# Patient Record
Sex: FEMALE | Race: WHITE | NOT HISPANIC OR LATINO | Employment: UNEMPLOYED | URBAN - METROPOLITAN AREA
[De-identification: names, ages, dates, MRNs, and addresses within clinical notes are randomized per-mention and may not be internally consistent; named-entity substitution may affect disease eponyms.]

---

## 2018-07-26 ENCOUNTER — APPOINTMENT (OUTPATIENT)
Dept: OBGYN | Facility: MEDICAL CENTER | Age: 28
End: 2018-07-26

## 2018-08-31 ENCOUNTER — GYNECOLOGY VISIT (OUTPATIENT)
Dept: OBGYN | Facility: MEDICAL CENTER | Age: 28
End: 2018-08-31
Payer: COMMERCIAL

## 2018-08-31 VITALS
HEIGHT: 65 IN | BODY MASS INDEX: 33.99 KG/M2 | SYSTOLIC BLOOD PRESSURE: 140 MMHG | DIASTOLIC BLOOD PRESSURE: 100 MMHG | WEIGHT: 204 LBS

## 2018-08-31 DIAGNOSIS — O99.891 PAIN IN SYMPHYSIS PUBIS DURING PREGNANCY: ICD-10-CM

## 2018-08-31 DIAGNOSIS — M79.18 PAIN IN SYMPHYSIS PUBIS DURING PREGNANCY: ICD-10-CM

## 2018-08-31 DIAGNOSIS — N93.8 DUB (DYSFUNCTIONAL UTERINE BLEEDING): ICD-10-CM

## 2018-08-31 DIAGNOSIS — Z3A.18 18 WEEKS GESTATION OF PREGNANCY: ICD-10-CM

## 2018-08-31 LAB — IN CLINIC OB SCAN: NORMAL

## 2018-08-31 PROCEDURE — 76815 OB US LIMITED FETUS(S): CPT | Performed by: OBSTETRICS & GYNECOLOGY

## 2018-08-31 PROCEDURE — 99203 OFFICE O/P NEW LOW 30 MIN: CPT | Mod: 25 | Performed by: OBSTETRICS & GYNECOLOGY

## 2018-08-31 PROCEDURE — 76830 TRANSVAGINAL US NON-OB: CPT | Performed by: OBSTETRICS & GYNECOLOGY

## 2018-08-31 NOTE — PROGRESS NOTES
"CC: Confirmation of Pregnancy    HPI: Pt is a 27 yo  lmp 18 (sure lmp, menses regular) who presents for evaluation of amenorrhea.  She has had no bleeding since her last menses.  A urine pregnancy test was positive.  She denies vaginal spotting or pain.  She denies notes nausea and vomiting. Complains of severe pain with ambulation and walking up stairs.     ROS: negative for dizziness, SOB, chest pain, palpitations, dysuria, vaginal discharge.    Blood pressure 140/100, height 1.651 m (5' 5\"), weight 92.5 kg (204 lb), last menstrual period 2018, not currently breastfeeding.    GENERAL: Alert, in no apparent distress  PSYCHIATRIC: Appropriate affect, intact insight and judgement.  ABDOMEN: Soft, nontender, nondistended.  Fundus 2 cm below umbillicus. No hepatosplenomegaly.   No rebound or guarding.  No inguinal lymphadenopathy.  BACK: No CVA tenderness  EXTREMITIES: No edema, no calf tenderness.    GENITOURINARY:  Normal external genitalia, no lesions.  Normal urethral meatus, no masses or tenderness.  Normal bladder without fullness or masses.  Vagina well estrogenized, no vaginal discharge or lesions.  Cervix normal length, nontender.  Uterus 18 weeks, NT.  Adnexa nontender, no masses.  Normal anus and perineum.      Limited Abdominal US - performed and interpreted by me    Single gestational sac.  Placenta anterior.    Martinez IUP with + fetal cardiac activity noted.    BPD = 4.09 cm, AC = 11.89 cm, FL = 2.54 cm, C/W 17+5/7 wks.    LES by US 2/3/19.    Ovaries not visualized and cervix grossly normal. Cervical length = 4.25 cm      ASSESSMENT/PLAN:  1. DUB visit - Martinez IUP at 17+5/7 wks.  Prenatal Vitamins.  F/U for NOB appointment next available.  2.  Symphysis pubis pain - PT referral placed.   3. Hx of GDM previous pregnancy    "

## 2018-09-13 ENCOUNTER — HOSPITAL ENCOUNTER (OUTPATIENT)
Facility: MEDICAL CENTER | Age: 28
End: 2018-09-13
Attending: OBSTETRICS & GYNECOLOGY
Payer: COMMERCIAL

## 2018-09-13 ENCOUNTER — HOSPITAL ENCOUNTER (OUTPATIENT)
Dept: LAB | Facility: MEDICAL CENTER | Age: 28
End: 2018-09-13
Attending: OBSTETRICS & GYNECOLOGY
Payer: COMMERCIAL

## 2018-09-13 ENCOUNTER — ROUTINE PRENATAL (OUTPATIENT)
Dept: OBGYN | Facility: MEDICAL CENTER | Age: 28
End: 2018-09-13
Payer: COMMERCIAL

## 2018-09-13 VITALS — WEIGHT: 207 LBS | BODY MASS INDEX: 34.45 KG/M2 | SYSTOLIC BLOOD PRESSURE: 119 MMHG | DIASTOLIC BLOOD PRESSURE: 84 MMHG

## 2018-09-13 DIAGNOSIS — O09.292 HISTORY OF GESTATIONAL DIABETES IN PRIOR PREGNANCY, CURRENTLY PREGNANT IN SECOND TRIMESTER: ICD-10-CM

## 2018-09-13 DIAGNOSIS — Z3A.19 19 WEEKS GESTATION OF PREGNANCY: ICD-10-CM

## 2018-09-13 DIAGNOSIS — Z86.32 HISTORY OF GESTATIONAL DIABETES IN PRIOR PREGNANCY, CURRENTLY PREGNANT IN SECOND TRIMESTER: ICD-10-CM

## 2018-09-13 LAB
ABO GROUP BLD: NORMAL
BACTERIA #/AREA URNS HPF: ABNORMAL /HPF
BLD GP AB SCN SERPL QL: NORMAL
CAOX CRY #/AREA URNS HPF: ABNORMAL /HPF
EPI CELLS #/AREA URNS HPF: ABNORMAL /HPF
RH BLD: NORMAL
WBC #/AREA URNS HPF: ABNORMAL /HPF

## 2018-09-13 PROCEDURE — 86850 RBC ANTIBODY SCREEN: CPT

## 2018-09-13 PROCEDURE — 87491 CHLMYD TRACH DNA AMP PROBE: CPT

## 2018-09-13 PROCEDURE — 87389 HIV-1 AG W/HIV-1&-2 AB AG IA: CPT

## 2018-09-13 PROCEDURE — 87340 HEPATITIS B SURFACE AG IA: CPT

## 2018-09-13 PROCEDURE — 86762 RUBELLA ANTIBODY: CPT

## 2018-09-13 PROCEDURE — 85025 COMPLETE CBC W/AUTO DIFF WBC: CPT

## 2018-09-13 PROCEDURE — 86901 BLOOD TYPING SEROLOGIC RH(D): CPT

## 2018-09-13 PROCEDURE — 86900 BLOOD TYPING SEROLOGIC ABO: CPT

## 2018-09-13 PROCEDURE — 87591 N.GONORRHOEAE DNA AMP PROB: CPT

## 2018-09-13 PROCEDURE — 86780 TREPONEMA PALLIDUM: CPT

## 2018-09-13 PROCEDURE — 59401 PR NEW OB VISIT: CPT | Performed by: OBSTETRICS & GYNECOLOGY

## 2018-09-13 PROCEDURE — 82950 GLUCOSE TEST: CPT

## 2018-09-13 PROCEDURE — 81001 URINALYSIS AUTO W/SCOPE: CPT

## 2018-09-13 PROCEDURE — 36415 COLL VENOUS BLD VENIPUNCTURE: CPT

## 2018-09-13 NOTE — PROGRESS NOTES
NOB.Pt denies bleeding and leaking. PT states that she is having lower abdomen pain when walking, sitting and while moving her legs.

## 2018-09-13 NOTE — PROGRESS NOTES
Cc: New OB visit    HPI:  The patient is a 28 y.o.  19w4d based upon LMP  Patient's last menstrual period was 2018.    Has been having some pain at symphysis, no VB  Worse with movement    She presents for her new obstetric visit.    She reports fetal movement, denies vaginal bleeding, denies leakage of fluid, denies contractions.     She denies nausea/vomiting, headaches, or urinary symptoms.      OB History    Para Term  AB Living   3 1 1     1   SAB TAB Ectopic Molar Multiple Live Births             1      # Outcome Date GA Lbr Alfred/2nd Weight Sex Delivery Anes PTL Lv   3 Current            2 Term 17 40w0d  3.969 kg (8 lb 12 oz) M Vag-Spont  N YUSUF      Complications: Pre-eclampsia   1                  History reviewed. No pertinent past medical history.  History reviewed. No pertinent surgical history.  Social History     Social History   • Marital status:      Spouse name: N/A   • Number of children: N/A   • Years of education: N/A     Occupational History   • Not on file.     Social History Main Topics   • Smoking status: Never Smoker   • Smokeless tobacco: Not on file   • Alcohol use No   • Drug use: No   • Sexual activity: Yes     Partners: Male     Other Topics Concern   • Not on file     Social History Narrative   • No narrative on file     Family History   Problem Relation Age of Onset   • Genetic Maternal Grandmother      Allergies:   Allergies as of 2018 - Reviewed 2018   Allergen Reaction Noted   • Nsaids  2018       PE:    Blood pressure 119/84, weight 93.9 kg (207 lb), last menstrual period 2018, not currently breastfeeding.    General: no apparent distress, is well developed and well nourished  Head: normocephalic, atraumatic  Neck: neck is supple, non-tender, no thyromegaly, full range of motion  CVS: regular rate and rhythm, no peripheral edema  Lungs: Normal respiratory effort. Clear to auscultation bilaterally  Abdomen: Bowel  sounds positive, nondistended, soft, nontender x4, no rebound or guarding.  Female GYN: normal female external genitalia without lesionsnormal external genitalia, no erythema, no discharge  Skin: No rashes, or ulcers or lesions seen  Psychiatric: Patient shows appropriate affect, is alert and oriented x3, intact judgment and insight.        A/P:  28 y.o.  19w4d based upon  Patient's last menstrual period was 2018..  She is here for her new obstetric visit.    1. 19 weeks gestation of pregnancy  PRENATAL PANEL 3+HIV+UACXI    GLUCOSE 1HR GESTATIONAL    US-OB 2ND 3RD TRI COMPLETE    CHLAMYDIA/GC PCR URINE OR SWAB   2. History of gestational diabetes in prior pregnancy, currently pregnant in second trimester         1. Ultrasound for dates and anatomy ordered.  2. second trimester screening for Down Syndrome and neural tube defects offered.  Patient declined  3.  ordered prenatal labs.  4.  NOB packet given with ACOG prenatal book.  5.  Increase water intake and encouraged healthy nutrition.  6.  Referral to MFM not needed at this time.  7.  Continue prenatal vitamins.  8.  Follow-up in 4 weeks.   9.  SAB precautions educated.  10.  Early glucola ordered given h/o GDM in prior pregnancy

## 2018-09-14 LAB
APPEARANCE UR: ABNORMAL
BASOPHILS # BLD AUTO: 0.4 % (ref 0–1.8)
BASOPHILS # BLD: 0.06 K/UL (ref 0–0.12)
BILIRUB UR QL STRIP.AUTO: NEGATIVE
C TRACH DNA SPEC QL NAA+PROBE: NEGATIVE
COLOR UR: YELLOW
EOSINOPHIL # BLD AUTO: 0.11 K/UL (ref 0–0.51)
EOSINOPHIL NFR BLD: 0.8 % (ref 0–6.9)
ERYTHROCYTE [DISTWIDTH] IN BLOOD BY AUTOMATED COUNT: 42.5 FL (ref 35.9–50)
GLUCOSE 1H P 50 G GLC PO SERPL-MCNC: 161 MG/DL (ref 70–139)
GLUCOSE UR STRIP.AUTO-MCNC: 100 MG/DL
HBV SURFACE AG SER QL: NEGATIVE
HCT VFR BLD AUTO: 40.4 % (ref 37–47)
HGB BLD-MCNC: 13.5 G/DL (ref 12–16)
HIV 1+2 AB+HIV1 P24 AG SERPL QL IA: NON REACTIVE
IMM GRANULOCYTES # BLD AUTO: 0.18 K/UL (ref 0–0.11)
IMM GRANULOCYTES NFR BLD AUTO: 1.3 % (ref 0–0.9)
KETONES UR STRIP.AUTO-MCNC: NEGATIVE MG/DL
LEUKOCYTE ESTERASE UR QL STRIP.AUTO: ABNORMAL
LYMPHOCYTES # BLD AUTO: 3.34 K/UL (ref 1–4.8)
LYMPHOCYTES NFR BLD: 24.8 % (ref 22–41)
MCH RBC QN AUTO: 31.5 PG (ref 27–33)
MCHC RBC AUTO-ENTMCNC: 33.4 G/DL (ref 33.6–35)
MCV RBC AUTO: 94.4 FL (ref 81.4–97.8)
MICRO URNS: ABNORMAL
MONOCYTES # BLD AUTO: 0.52 K/UL (ref 0–0.85)
MONOCYTES NFR BLD AUTO: 3.9 % (ref 0–13.4)
N GONORRHOEA DNA SPEC QL NAA+PROBE: NEGATIVE
NEUTROPHILS # BLD AUTO: 9.26 K/UL (ref 2–7.15)
NEUTROPHILS NFR BLD: 68.8 % (ref 44–72)
NITRITE UR QL STRIP.AUTO: NEGATIVE
NRBC # BLD AUTO: 0 K/UL
NRBC BLD-RTO: 0 /100 WBC
PH UR STRIP.AUTO: 6 [PH]
PLATELET # BLD AUTO: 241 K/UL (ref 164–446)
PMV BLD AUTO: 11 FL (ref 9–12.9)
PROT UR QL STRIP: NEGATIVE MG/DL
RBC # BLD AUTO: 4.28 M/UL (ref 4.2–5.4)
RBC UR QL AUTO: NEGATIVE
RUBV AB SER QL: >500 IU/ML
SP GR UR STRIP.AUTO: 1.03
SPECIMEN SOURCE: NORMAL
TREPONEMA PALLIDUM IGG+IGM AB [PRESENCE] IN SERUM OR PLASMA BY IMMUNOASSAY: NON REACTIVE
UROBILINOGEN UR STRIP.AUTO-MCNC: 0.2 MG/DL
WBC # BLD AUTO: 13.5 K/UL (ref 4.8–10.8)

## 2018-09-17 ENCOUNTER — TELEPHONE (OUTPATIENT)
Dept: OBGYN | Facility: CLINIC | Age: 28
End: 2018-09-17

## 2018-09-17 DIAGNOSIS — R73.09 ELEVATED GLUCOSE TOLERANCE TEST: ICD-10-CM

## 2018-09-20 ENCOUNTER — TELEPHONE (OUTPATIENT)
Dept: OBGYN | Facility: MEDICAL CENTER | Age: 28
End: 2018-09-20

## 2018-09-20 NOTE — TELEPHONE ENCOUNTER
Called patient, no answer. Left voicemail asking patient to call us back to let her know her GC/CT results were negative.

## 2018-10-04 ENCOUNTER — HOSPITAL ENCOUNTER (OUTPATIENT)
Dept: RADIOLOGY | Facility: MEDICAL CENTER | Age: 28
End: 2018-10-04
Attending: OBSTETRICS & GYNECOLOGY
Payer: COMMERCIAL

## 2018-10-04 DIAGNOSIS — Z3A.19 19 WEEKS GESTATION OF PREGNANCY: ICD-10-CM

## 2018-10-04 PROCEDURE — 76805 OB US >/= 14 WKS SNGL FETUS: CPT

## 2018-10-11 ENCOUNTER — ROUTINE PRENATAL (OUTPATIENT)
Dept: OBGYN | Facility: MEDICAL CENTER | Age: 28
End: 2018-10-11
Payer: COMMERCIAL

## 2018-10-11 VITALS — DIASTOLIC BLOOD PRESSURE: 84 MMHG | WEIGHT: 212 LBS | SYSTOLIC BLOOD PRESSURE: 130 MMHG | BODY MASS INDEX: 35.28 KG/M2

## 2018-10-11 DIAGNOSIS — Z3A.23 23 WEEKS GESTATION OF PREGNANCY: ICD-10-CM

## 2018-10-11 DIAGNOSIS — Z86.32 HISTORY OF GESTATIONAL DIABETES MELLITUS (GDM): ICD-10-CM

## 2018-10-11 PROCEDURE — 90040 PR PRENATAL FOLLOW UP: CPT | Performed by: OBSTETRICS & GYNECOLOGY

## 2018-10-11 NOTE — PROGRESS NOTES
S: Pt presents for routine OB follow up.  No contractions, vaginal bleeding, or leaking fluids. Good fetal movement. Pt c/o hemorrhoids.  Failed 1 hour GTT    Questions answered.    O: /84   Wt 96.2 kg (212 lb)   LMP 2018   BMI 35.28 kg/m²   Patients' weight gain, fluid intake and exercise level discussed.  Vitals, fundal height , fetal position, and FHR reviewed on flowsheet    Lab:No results found for this or any previous visit (from the past 336 hour(s)).    A/P:  28 y.o.  at 23w4d presents for routine obstetric follow-up.  Size equals dates and/or scan    1.  Continue prenatal vitamins.  2.  Begin kick counts at 28 weeks.  3.  Exercise at least 30 minutes daily.  4.  Increase water intake.  5.  Follow-up in 4 weeks.  6.  Prep H or Tucks pads for hemorrhoids  7.  3 hour glucola ordered  8.  Monitor BP  9.  FAS reviewed

## 2018-10-11 NOTE — PROGRESS NOTES
Ob visit @ 23w4d.Pt has been doing well. States active FM, admits to slight intermittant cramping down low with no associated bleeding or leaking fluid. Pt wentto lab to have 3 hr gtt done, no orders in chart.Order will be given today. Pt states possible hemmorhoid with no bleeding,unsure of constipation,drinking plenty of Po fluids. RH negative,will need rhogam @ 28 weeks.

## 2018-10-12 ENCOUNTER — TELEPHONE (OUTPATIENT)
Dept: OBGYN | Facility: MEDICAL CENTER | Age: 28
End: 2018-10-12

## 2018-10-12 ENCOUNTER — HOSPITAL ENCOUNTER (OUTPATIENT)
Dept: LAB | Facility: MEDICAL CENTER | Age: 28
End: 2018-10-12
Attending: OBSTETRICS & GYNECOLOGY
Payer: COMMERCIAL

## 2018-10-12 DIAGNOSIS — Z3A.23 23 WEEKS GESTATION OF PREGNANCY: ICD-10-CM

## 2018-10-12 PROCEDURE — 82952 GTT-ADDED SAMPLES: CPT

## 2018-10-12 PROCEDURE — 82951 GLUCOSE TOLERANCE TEST (GTT): CPT

## 2018-10-12 PROCEDURE — 36415 COLL VENOUS BLD VENIPUNCTURE: CPT

## 2018-10-12 NOTE — TELEPHONE ENCOUNTER
Cheli from [facility] called to verify her 3 hr test order, was notified that correct one is for 3 hr GTT gestational.  States she is going to change order.  Not further questions.

## 2018-10-13 LAB
GLUCOSE 1H P CHAL SERPL-MCNC: 262 MG/DL (ref 65–180)
GLUCOSE 2H P CHAL SERPL-MCNC: 235 MG/DL (ref 65–155)
GLUCOSE 3H P CHAL SERPL-MCNC: 134 MG/DL (ref 65–140)
GLUCOSE BS SERPL-MCNC: 128 MG/DL (ref 65–95)

## 2018-11-01 ENCOUNTER — TELEPHONE (OUTPATIENT)
Dept: OBGYN | Facility: MEDICAL CENTER | Age: 28
End: 2018-11-01

## 2018-11-01 DIAGNOSIS — O24.419 GESTATIONAL DIABETES MELLITUS (GDM) IN THIRD TRIMESTER, GESTATIONAL DIABETES METHOD OF CONTROL UNSPECIFIED: Primary | ICD-10-CM

## 2018-11-01 NOTE — TELEPHONE ENCOUNTER
----- Message from Fareed Lepe M.D. sent at 10/22/2018  2:55 PM PDT -----  Pt has GDM, needs to see nutritionist ASAP    Unable to contact pt, msg left to call back.

## 2018-11-08 ENCOUNTER — HOSPITAL ENCOUNTER (OUTPATIENT)
Facility: MEDICAL CENTER | Age: 28
End: 2018-11-08
Attending: OBSTETRICS & GYNECOLOGY
Payer: COMMERCIAL

## 2018-11-08 ENCOUNTER — HOSPITAL ENCOUNTER (OUTPATIENT)
Facility: MEDICAL CENTER | Age: 28
End: 2018-11-08
Attending: OBSTETRICS & GYNECOLOGY | Admitting: OBSTETRICS & GYNECOLOGY
Payer: COMMERCIAL

## 2018-11-08 ENCOUNTER — ROUTINE PRENATAL (OUTPATIENT)
Dept: OBGYN | Facility: MEDICAL CENTER | Age: 28
End: 2018-11-08
Payer: COMMERCIAL

## 2018-11-08 VITALS — DIASTOLIC BLOOD PRESSURE: 82 MMHG | WEIGHT: 218 LBS | SYSTOLIC BLOOD PRESSURE: 140 MMHG | BODY MASS INDEX: 36.28 KG/M2

## 2018-11-08 VITALS — DIASTOLIC BLOOD PRESSURE: 81 MMHG | TEMPERATURE: 98.5 F | SYSTOLIC BLOOD PRESSURE: 127 MMHG | HEART RATE: 102 BPM

## 2018-11-08 DIAGNOSIS — O09.93 SUPERVISION OF HIGH RISK PREGNANCY IN THIRD TRIMESTER: ICD-10-CM

## 2018-11-08 LAB
ALBUMIN SERPL BCP-MCNC: 3.5 G/DL (ref 3.2–4.9)
ALBUMIN/GLOB SERPL: 1.3 G/DL
ALP SERPL-CCNC: 60 U/L (ref 30–99)
ALT SERPL-CCNC: <5 U/L (ref 2–50)
ANION GAP SERPL CALC-SCNC: 8 MMOL/L (ref 0–11.9)
APPEARANCE UR: ABNORMAL
AST SERPL-CCNC: 8 U/L (ref 12–45)
BACTERIA #/AREA URNS HPF: NEGATIVE /HPF
BASOPHILS # BLD AUTO: 0.4 % (ref 0–1.8)
BASOPHILS # BLD: 0.04 K/UL (ref 0–0.12)
BILIRUB SERPL-MCNC: 0.2 MG/DL (ref 0.1–1.5)
BILIRUB UR QL STRIP.AUTO: NEGATIVE
BUN SERPL-MCNC: 8 MG/DL (ref 8–22)
CALCIUM SERPL-MCNC: 9.2 MG/DL (ref 8.5–10.5)
CAOX CRY #/AREA URNS HPF: NORMAL /HPF
CHLORIDE SERPL-SCNC: 106 MMOL/L (ref 96–112)
CO2 SERPL-SCNC: 21 MMOL/L (ref 20–33)
COLOR UR: YELLOW
CREAT SERPL-MCNC: 0.44 MG/DL (ref 0.5–1.4)
CREAT UR-MCNC: 235.1 MG/DL
EOSINOPHIL # BLD AUTO: 0.06 K/UL (ref 0–0.51)
EOSINOPHIL NFR BLD: 0.6 % (ref 0–6.9)
EPI CELLS #/AREA URNS HPF: NORMAL /HPF
ERYTHROCYTE [DISTWIDTH] IN BLOOD BY AUTOMATED COUNT: 42.7 FL (ref 35.9–50)
EST. AVERAGE GLUCOSE BLD GHB EST-MCNC: 134 MG/DL
GLOBULIN SER CALC-MCNC: 2.7 G/DL (ref 1.9–3.5)
GLUCOSE SERPL-MCNC: 117 MG/DL (ref 65–99)
GLUCOSE UR STRIP.AUTO-MCNC: 500 MG/DL
HBA1C MFR BLD: 6.3 % (ref 0–5.6)
HCT VFR BLD AUTO: 39.2 % (ref 37–47)
HGB BLD-MCNC: 12.7 G/DL (ref 12–16)
HYALINE CASTS #/AREA URNS LPF: NORMAL /LPF
IMM GRANULOCYTES # BLD AUTO: 0.14 K/UL (ref 0–0.11)
IMM GRANULOCYTES NFR BLD AUTO: 1.3 % (ref 0–0.9)
KETONES UR STRIP.AUTO-MCNC: ABNORMAL MG/DL
LEUKOCYTE ESTERASE UR QL STRIP.AUTO: NEGATIVE
LYMPHOCYTES # BLD AUTO: 2.37 K/UL (ref 1–4.8)
LYMPHOCYTES NFR BLD: 22.3 % (ref 22–41)
MCH RBC QN AUTO: 30.8 PG (ref 27–33)
MCHC RBC AUTO-ENTMCNC: 32.4 G/DL (ref 33.6–35)
MCV RBC AUTO: 94.9 FL (ref 81.4–97.8)
MICRO URNS: ABNORMAL
MONOCYTES # BLD AUTO: 0.52 K/UL (ref 0–0.85)
MONOCYTES NFR BLD AUTO: 4.9 % (ref 0–13.4)
MUCOUS THREADS #/AREA URNS HPF: NORMAL /HPF
NEUTROPHILS # BLD AUTO: 7.51 K/UL (ref 2–7.15)
NEUTROPHILS NFR BLD: 70.5 % (ref 44–72)
NITRITE UR QL STRIP.AUTO: NEGATIVE
NRBC # BLD AUTO: 0 K/UL
NRBC BLD-RTO: 0 /100 WBC
PH UR STRIP.AUTO: 5 [PH]
PLATELET # BLD AUTO: 208 K/UL (ref 164–446)
PMV BLD AUTO: 11.1 FL (ref 9–12.9)
POTASSIUM SERPL-SCNC: 3.8 MMOL/L (ref 3.6–5.5)
PROT SERPL-MCNC: 6.2 G/DL (ref 6–8.2)
PROT UR QL STRIP: NEGATIVE MG/DL
PROT UR-MCNC: 31.3 MG/DL (ref 0–15)
PROT/CREAT UR: 133 MG/G (ref 10–107)
RBC # BLD AUTO: 4.13 M/UL (ref 4.2–5.4)
RBC UR QL AUTO: NEGATIVE
SODIUM SERPL-SCNC: 135 MMOL/L (ref 135–145)
SP GR UR STRIP.AUTO: 1.03
URATE SERPL-MCNC: 4.1 MG/DL (ref 1.9–8.2)
UROBILINOGEN UR STRIP.AUTO-MCNC: 0.2 MG/DL
WBC # BLD AUTO: 10.6 K/UL (ref 4.8–10.8)
WBC #/AREA URNS HPF: NORMAL /HPF

## 2018-11-08 PROCEDURE — 80053 COMPREHEN METABOLIC PANEL: CPT

## 2018-11-08 PROCEDURE — 81001 URINALYSIS AUTO W/SCOPE: CPT

## 2018-11-08 PROCEDURE — 82570 ASSAY OF URINE CREATININE: CPT

## 2018-11-08 PROCEDURE — 36415 COLL VENOUS BLD VENIPUNCTURE: CPT

## 2018-11-08 PROCEDURE — 84550 ASSAY OF BLOOD/URIC ACID: CPT

## 2018-11-08 PROCEDURE — 84156 ASSAY OF PROTEIN URINE: CPT | Mod: 91

## 2018-11-08 PROCEDURE — 85025 COMPLETE CBC W/AUTO DIFF WBC: CPT

## 2018-11-08 PROCEDURE — 83036 HEMOGLOBIN GLYCOSYLATED A1C: CPT

## 2018-11-08 PROCEDURE — 81050 URINALYSIS VOLUME MEASURE: CPT

## 2018-11-08 PROCEDURE — 84156 ASSAY OF PROTEIN URINE: CPT

## 2018-11-08 PROCEDURE — 90040 PR PRENATAL FOLLOW UP: CPT | Performed by: OBSTETRICS & GYNECOLOGY

## 2018-11-08 NOTE — PROGRESS NOTES
Ob visit @ 27w4d. Pt states doing well with active FM, no VB or LOF. Pt has not been advised of elevated 3hr gtt and she is given information today to call [facility] for diet teaching/glucometer testing.

## 2018-11-08 NOTE — PROGRESS NOTES
EDC- 2/3, EGA- 27.4    1100- Pt arrived to L&D from her appointment for PIH workup.  Urine sample obtained.  Pt reports 4/10 HA, has not tried taking tylenol.  Reports seeing spots before her eyes.  Denies RUQ pain, reflexes 2+ bilat, no clonus.  Generalized edema.  EFM/TOCO applied, serial BP's started.  24 hour urine started per MD orders.  1315- Report to Dr. Lepe regarding BP's and lab results.  Orders received to discharge pt home.  Discussed 24 hour urine instructions, PIH precautions,  labor, and kick counts, pt verbalizes understanding.  1325- Pt discharged home ambulatory in stable condition.

## 2018-11-08 NOTE — PROGRESS NOTES
28 y.o.  27w4d The patient is here for routine obstetrical followup. She reports fetal movement, although somewhat decreased. She denies contractions, vaginal bleeding, or leaking of fluid. Complains of intermittent severe headaches.  + edema.      The patient's pregnancy is complicated by   Patient Active Problem List    Diagnosis Date Noted   • 23 weeks gestation of pregnancy 10/11/2018   • History of gestational diabetes mellitus (GDM) 10/11/2018   3 hour glucola 128/262/235/134 - referral placed for diabetic teaching 1 month ago.  Number given to diabetic teaching for patient to f/u.  Repeat BPs 140s/90s.  Will send to L&D for serial BPS, PIH labs, 24 hour urine, Hgb A1c, and possible steroids.     Followup in 1 week   labor precautions were discussed with patient  Fetal kick counts were discussed with patient

## 2018-11-09 ENCOUNTER — TELEPHONE (OUTPATIENT)
Dept: OBGYN | Facility: CLINIC | Age: 28
End: 2018-11-09

## 2018-11-09 LAB — PROT 24H UR-MRATE: 8.1 MG/DL (ref 0–15)

## 2018-11-09 PROCEDURE — 84156 ASSAY OF PROTEIN URINE: CPT

## 2018-11-09 PROCEDURE — 81050 URINALYSIS VOLUME MEASURE: CPT

## 2018-11-09 NOTE — TELEPHONE ENCOUNTER
Meggan called requesting ICD code.     1315 I called back, spoke with Jaelyn who informed me that patient dropped off a 24 hr urine and they need ICD code. Jaelyn will leave a message for Meggan to return my call.    1513 Mayela called back, provided ICD code. Meggan had no other questions

## 2018-11-21 ENCOUNTER — ROUTINE PRENATAL (OUTPATIENT)
Dept: OBGYN | Facility: MEDICAL CENTER | Age: 28
End: 2018-11-21
Payer: COMMERCIAL

## 2018-11-21 VITALS — DIASTOLIC BLOOD PRESSURE: 86 MMHG | BODY MASS INDEX: 36.94 KG/M2 | WEIGHT: 222 LBS | SYSTOLIC BLOOD PRESSURE: 130 MMHG

## 2018-11-21 DIAGNOSIS — O24.419 GESTATIONAL DIABETES MELLITUS (GDM) IN THIRD TRIMESTER, GESTATIONAL DIABETES METHOD OF CONTROL UNSPECIFIED: ICD-10-CM

## 2018-11-21 PROCEDURE — 90040 PR PRENATAL FOLLOW UP: CPT | Performed by: OBSTETRICS & GYNECOLOGY

## 2018-11-21 NOTE — PROGRESS NOTES
28 y.o.  29w3d The patient is here for routine obstetrical followup. She reports good fetal movement. She denies contractions, vaginal bleeding, or leaking of fluid.    The patient's pregnancy is complicated by   Patient Active Problem List    Diagnosis Date Noted   • Gestational diabetes 2018   • 23 weeks gestation of pregnancy 10/11/2018   • History of gestational diabetes mellitus (GDM) 10/11/2018     Gestational diabetes - she is not scheduled for diabetic education until 18.  She states she was a gestational diabetic before with her previous pregnancy, and has been checking glucoses and following diet with less than 30 grams of carbohydrates per meal.  She reports fasting glucoses 120-130s and 1 hour postprandials of 150-180s.  Will start Metformin 500 mg po bid until we can get her into diabetic education for further diet assistance, and will likely need insulin.     BPs elevated on previous visit - PIH labs and 24 hour urine normal.       Followup in 1 week   labor precautions were discussed with patient  Fetal kick counts were discussed with patient

## 2018-11-21 NOTE — PROGRESS NOTES
Pt here today for OB follow up  Pt states that she was dizzy last week  Reports +FM  Good # [phone]  Pharmacy Confirmed.   Id:[id number]

## 2018-11-26 ENCOUNTER — ROUTINE PRENATAL (OUTPATIENT)
Dept: OBGYN | Facility: MEDICAL CENTER | Age: 28
End: 2018-11-26
Payer: COMMERCIAL

## 2018-11-26 VITALS — BODY MASS INDEX: 36.94 KG/M2 | DIASTOLIC BLOOD PRESSURE: 78 MMHG | SYSTOLIC BLOOD PRESSURE: 120 MMHG | WEIGHT: 222 LBS

## 2018-11-26 DIAGNOSIS — Z34.82 NORMAL PREGNANCY IN MULTIGRAVIDA IN SECOND TRIMESTER: ICD-10-CM

## 2018-11-26 DIAGNOSIS — Z67.91 RH NEGATIVE STATE IN ANTEPARTUM PERIOD: ICD-10-CM

## 2018-11-26 DIAGNOSIS — O26.899 RH NEGATIVE STATE IN ANTEPARTUM PERIOD: ICD-10-CM

## 2018-11-26 PROCEDURE — 90040 PR PRENATAL FOLLOW UP: CPT | Performed by: OBSTETRICS & GYNECOLOGY

## 2018-11-26 PROCEDURE — 90686 IIV4 VACC NO PRSV 0.5 ML IM: CPT | Performed by: OBSTETRICS & GYNECOLOGY

## 2018-11-26 PROCEDURE — 90471 IMMUNIZATION ADMIN: CPT | Performed by: OBSTETRICS & GYNECOLOGY

## 2018-11-26 NOTE — PROGRESS NOTES
Pt here today for OB follow up  Pt denies any leaking fluids or contractions  Reports +FM  Pharmacy Confirmed.

## 2018-11-26 NOTE — NON-PROVIDER
NDC:10064-269-51  LOT#:[id number]  Expiration Date: 10/20/2020    Dose: 1500 IU  Site: Left Deltoid    Patient educated on use and side effects of medication. Name and  verified prior to injection. Pt tolerated? yes     Verified by MB    Administered by Samantha Harper at 9:00 AM.    Patient Provided Medication: no

## 2018-11-26 NOTE — PROGRESS NOTES
HUDSON:  30w1d  Visit done with phone Bhutanese interpretor    Pt reports doing well.  Denies vaginal bleeding, contractions, LOF.  Reports +FM.    On metformin 500mg BID.  No BG log but reports BGs fasting 110s, PP 140s-150s      /78   Wt 100.7 kg (222 lb)   LMP 2018   BMI 36.94 kg/m²   gen: AAO, NAD  FHTs: 155  FH: 32    A/P: 28 y.o.  @ 30w1d w/ A2DM, Rh neg status  - PNL up to date, Rh neg (rhogam today); anatomy US wnl  - A2DM: has education .    To start 2x weekly NST at 32wks   Increase metforming to 500qAM, 1000qHS  - elevated BP x1 @ 27wks, normotensive since with normal preE labs; does not yet meet criteria for hypertensive disorder of pregnancy  - flu today will give Tdap next visit    Reviewed labor precautions (to call or come to hospital for ctx q5min, VB, LOF, decreased FM)    RTC 1wks w/ BG log    Ileana Velasco MD  [facility], Women's Health

## 2018-11-30 ENCOUNTER — APPOINTMENT (OUTPATIENT)
Dept: HEALTH INFORMATION MANAGEMENT | Facility: MEDICAL CENTER | Age: 28
End: 2018-11-30
Payer: COMMERCIAL

## 2018-12-12 ENCOUNTER — HOSPITAL ENCOUNTER (OUTPATIENT)
Facility: MEDICAL CENTER | Age: 28
End: 2018-12-12
Attending: OBSTETRICS & GYNECOLOGY | Admitting: OBSTETRICS & GYNECOLOGY
Payer: COMMERCIAL

## 2018-12-12 ENCOUNTER — ROUTINE PRENATAL (OUTPATIENT)
Dept: OBGYN | Facility: MEDICAL CENTER | Age: 28
End: 2018-12-12
Payer: COMMERCIAL

## 2018-12-12 VITALS
HEART RATE: 102 BPM | HEIGHT: 67 IN | WEIGHT: 220.46 LBS | DIASTOLIC BLOOD PRESSURE: 85 MMHG | BODY MASS INDEX: 34.6 KG/M2 | TEMPERATURE: 97.5 F | SYSTOLIC BLOOD PRESSURE: 130 MMHG | RESPIRATION RATE: 16 BRPM

## 2018-12-12 VITALS — SYSTOLIC BLOOD PRESSURE: 134 MMHG | WEIGHT: 224.76 LBS | DIASTOLIC BLOOD PRESSURE: 90 MMHG | BODY MASS INDEX: 37.4 KG/M2

## 2018-12-12 DIAGNOSIS — O24.415 GESTATIONAL DIABETES MELLITUS (GDM) IN THIRD TRIMESTER CONTROLLED ON ORAL HYPOGLYCEMIC DRUG: ICD-10-CM

## 2018-12-12 DIAGNOSIS — R30.0 DYSURIA DURING PREGNANCY IN THIRD TRIMESTER: ICD-10-CM

## 2018-12-12 DIAGNOSIS — O26.893 DYSURIA DURING PREGNANCY IN THIRD TRIMESTER: ICD-10-CM

## 2018-12-12 LAB
ALBUMIN SERPL BCP-MCNC: 3.4 G/DL (ref 3.2–4.9)
ALBUMIN/GLOB SERPL: 1.4 G/DL
ALP SERPL-CCNC: 84 U/L (ref 30–99)
ALT SERPL-CCNC: 5 U/L (ref 2–50)
ANION GAP SERPL CALC-SCNC: 10 MMOL/L (ref 0–11.9)
APPEARANCE UR: NORMAL
AST SERPL-CCNC: 11 U/L (ref 12–45)
BASOPHILS # BLD AUTO: 0.3 % (ref 0–1.8)
BASOPHILS # BLD: 0.03 K/UL (ref 0–0.12)
BILIRUB SERPL-MCNC: 0.3 MG/DL (ref 0.1–1.5)
BILIRUB UR STRIP-MCNC: NORMAL MG/DL
BUN SERPL-MCNC: 8 MG/DL (ref 8–22)
CALCIUM SERPL-MCNC: 8.6 MG/DL (ref 8.5–10.5)
CHLORIDE SERPL-SCNC: 108 MMOL/L (ref 96–112)
CO2 SERPL-SCNC: 20 MMOL/L (ref 20–33)
COLOR UR AUTO: NORMAL
CREAT SERPL-MCNC: 0.53 MG/DL (ref 0.5–1.4)
CREAT UR-MCNC: 139.8 MG/DL
EOSINOPHIL # BLD AUTO: 0.06 K/UL (ref 0–0.51)
EOSINOPHIL NFR BLD: 0.6 % (ref 0–6.9)
ERYTHROCYTE [DISTWIDTH] IN BLOOD BY AUTOMATED COUNT: 42.8 FL (ref 35.9–50)
GLOBULIN SER CALC-MCNC: 2.5 G/DL (ref 1.9–3.5)
GLUCOSE SERPL-MCNC: 154 MG/DL (ref 65–99)
GLUCOSE UR STRIP.AUTO-MCNC: NORMAL MG/DL
HCT VFR BLD AUTO: 37.7 % (ref 37–47)
HGB BLD-MCNC: 12.2 G/DL (ref 12–16)
IMM GRANULOCYTES # BLD AUTO: 0.08 K/UL (ref 0–0.11)
IMM GRANULOCYTES NFR BLD AUTO: 0.8 % (ref 0–0.9)
KETONES UR STRIP.AUTO-MCNC: NORMAL MG/DL
LEUKOCYTE ESTERASE UR QL STRIP.AUTO: NORMAL
LYMPHOCYTES # BLD AUTO: 2.46 K/UL (ref 1–4.8)
LYMPHOCYTES NFR BLD: 25.8 % (ref 22–41)
MCH RBC QN AUTO: 30.7 PG (ref 27–33)
MCHC RBC AUTO-ENTMCNC: 32.4 G/DL (ref 33.6–35)
MCV RBC AUTO: 95 FL (ref 81.4–97.8)
MONOCYTES # BLD AUTO: 0.52 K/UL (ref 0–0.85)
MONOCYTES NFR BLD AUTO: 5.4 % (ref 0–13.4)
NEUTROPHILS # BLD AUTO: 6.4 K/UL (ref 2–7.15)
NEUTROPHILS NFR BLD: 67.1 % (ref 44–72)
NITRITE UR QL STRIP.AUTO: NORMAL
NRBC # BLD AUTO: 0 K/UL
NRBC BLD-RTO: 0 /100 WBC
PH UR STRIP.AUTO: 6 [PH] (ref 5–8)
PLATELET # BLD AUTO: 191 K/UL (ref 164–446)
PMV BLD AUTO: 10.6 FL (ref 9–12.9)
POTASSIUM SERPL-SCNC: 3.8 MMOL/L (ref 3.6–5.5)
PROT SERPL-MCNC: 5.9 G/DL (ref 6–8.2)
PROT UR QL STRIP: NORMAL MG/DL
PROT UR-MCNC: 18.3 MG/DL (ref 0–15)
PROT/CREAT UR: 131 MG/G (ref 10–107)
RBC # BLD AUTO: 3.97 M/UL (ref 4.2–5.4)
RBC UR QL AUTO: NORMAL
SODIUM SERPL-SCNC: 138 MMOL/L (ref 135–145)
SP GR UR STRIP.AUTO: 1.03
UROBILINOGEN UR STRIP-MCNC: NORMAL MG/DL
WBC # BLD AUTO: 9.6 K/UL (ref 4.8–10.8)

## 2018-12-12 PROCEDURE — 82570 ASSAY OF URINE CREATININE: CPT

## 2018-12-12 PROCEDURE — 59025 FETAL NON-STRESS TEST: CPT

## 2018-12-12 PROCEDURE — 81002 URINALYSIS NONAUTO W/O SCOPE: CPT | Performed by: OBSTETRICS & GYNECOLOGY

## 2018-12-12 PROCEDURE — 80053 COMPREHEN METABOLIC PANEL: CPT

## 2018-12-12 PROCEDURE — 84156 ASSAY OF PROTEIN URINE: CPT

## 2018-12-12 PROCEDURE — 90471 IMMUNIZATION ADMIN: CPT | Performed by: OBSTETRICS & GYNECOLOGY

## 2018-12-12 PROCEDURE — 90715 TDAP VACCINE 7 YRS/> IM: CPT | Performed by: OBSTETRICS & GYNECOLOGY

## 2018-12-12 PROCEDURE — 36415 COLL VENOUS BLD VENIPUNCTURE: CPT

## 2018-12-12 PROCEDURE — 90040 PR PRENATAL FOLLOW UP: CPT | Performed by: OBSTETRICS & GYNECOLOGY

## 2018-12-12 PROCEDURE — 85025 COMPLETE CBC W/AUTO DIFF WBC: CPT

## 2018-12-12 RX ORDER — GLYBURIDE 2.5 MG/1
2.5 TABLET ORAL
Qty: 30 TAB | Refills: 1 | Status: SHIPPED | OUTPATIENT
Start: 2018-12-12 | End: 2018-12-27 | Stop reason: SDUPTHER

## 2018-12-12 NOTE — PROGRESS NOTES
Pt here today for OB follow up  Pt states no complaints  Reports +FM  Good #[phone]  Pharmacy Confirmed.    NDC: 32984-477-48  LOT#: [id number]   Expiration Date: 2020    Dose: 0.5ml  Site: Right Deltoid    Patient educated on use and side effects of medication. Name and  verified prior to injection. Pt tolerated? yes     Verified by TM    Administered by Samantha Harper at 4:12 PM.    Patient Provided Medication: no

## 2018-12-12 NOTE — PROGRESS NOTES
HUDSON:  32w3d  Visit performed via phone Moldovan interpretor    Pt reports doing well.  Denies vaginal bleeding, contractions, LOF.  Reports +FM.  Denies HA/RUQ pain/N/V.  Says she has had some black spots in vision today but on and off since about 5mos pregnant.    BG log:  Fastin-115, 6/6 >95  PP breakfast:  113-167, 2/6 >140  PP lunch: , 0/6 >140  PP dinner: , 0/5 >140      /90   Wt 101.9 kg (224 lb 12.1 oz)   LMP 2018   BMI 37.40 kg/m²    Repeat: /90    gen: AAO, NAD  Abd: gravid, NT, FHTs: 150, FH: 33  Ext: tr edema, NT; 2+ DTRs    A/P: 28 y.o.  @ 32w3d w/ A2DM, elevated BP  - PNL up to date, Rh neg s/p rhogam,  - elevated Bp today, pt asx currently, has been elevated 1x at 27wks with neg workup/24hr urine.  Suspect gHTN, neg Udip in office but will send to triage now for labs/NST/serial Bps for further eval.  Dr. Lepe and L&D charge aware; may need to start 2x weekly NSTs for gHTN  - A2DM: on metformin 500mg qAM/1000mg qPM, fastings elevated persistently.  Discussed typically would start insulin, pt was on glyburide in last pregnancy and would prefer to avoid insulin.  Will start glyburide 2.5mg qHS, discussed importance of protein snack before bed.  Cont metformin at current dose.  New regimen: metformin 500/1000, glyburide 2.5 qHS  - Tdap today    To L&D now for BP evaluation    RTC 1wks     Ileana Velasco MD  [facility], Women's Health

## 2018-12-13 NOTE — PROGRESS NOTES
" EDC - 2/3/19 EGA - 32.3    1727 - Pt arrived to labor and delivery from [facility] office for elevated BPs. Pt placed in room S 234.  1735 - External monitors in place X2. Category I FHT at this time. VSS. Pt reports good FM. No complaints of contractions, ROM or vaginal bleeding. Pt states that she has had some \"spotty vision since 5 months\". Denies HA, RUQ pain, swelling. BLE reflexes +2. Dr. simon updated, orders received. FOB at bedside. POC discussed with pt and family members, all questions answered.    - Report to THERESE Gallegos RN  "

## 2018-12-13 NOTE — PROGRESS NOTES
1900- Report received from CHAYITO Cali RN. Pt resting in bed, family at bedside. Waiting for lab results. POC discussed.     1944- Update to Dr. Cortez, by phone, regarding lab results. Discharge orders received.     1950-  discharge instructions reviewed and provided to pt. Pt expressed verbal understanding of instructions.     1953- Pt ambulated off unit, accompanied by family, without incident.

## 2018-12-22 ENCOUNTER — HOSPITAL ENCOUNTER (OUTPATIENT)
Facility: MEDICAL CENTER | Age: 28
End: 2018-12-22
Attending: OBSTETRICS & GYNECOLOGY | Admitting: OBSTETRICS & GYNECOLOGY
Payer: COMMERCIAL

## 2018-12-22 VITALS
BODY MASS INDEX: 35.26 KG/M2 | DIASTOLIC BLOOD PRESSURE: 73 MMHG | WEIGHT: 224.65 LBS | HEIGHT: 67 IN | HEART RATE: 100 BPM | SYSTOLIC BLOOD PRESSURE: 127 MMHG | TEMPERATURE: 98.6 F | OXYGEN SATURATION: 96 %

## 2018-12-22 VITALS — HEART RATE: 112 BPM | TEMPERATURE: 98.6 F | SYSTOLIC BLOOD PRESSURE: 136 MMHG | DIASTOLIC BLOOD PRESSURE: 85 MMHG

## 2018-12-22 LAB
ALBUMIN SERPL BCP-MCNC: 3.3 G/DL (ref 3.2–4.9)
ALBUMIN/GLOB SERPL: 1.1 G/DL
ALP SERPL-CCNC: 98 U/L (ref 30–99)
ALT SERPL-CCNC: <5 U/L (ref 2–50)
ANION GAP SERPL CALC-SCNC: 12 MMOL/L (ref 0–11.9)
AST SERPL-CCNC: 9 U/L (ref 12–45)
BASOPHILS # BLD AUTO: 0.5 % (ref 0–1.8)
BASOPHILS # BLD: 0.05 K/UL (ref 0–0.12)
BILIRUB SERPL-MCNC: 0.2 MG/DL (ref 0.1–1.5)
BUN SERPL-MCNC: 9 MG/DL (ref 8–22)
CALCIUM SERPL-MCNC: 9.1 MG/DL (ref 8.5–10.5)
CHLORIDE SERPL-SCNC: 107 MMOL/L (ref 96–112)
CO2 SERPL-SCNC: 15 MMOL/L (ref 20–33)
CREAT SERPL-MCNC: 0.51 MG/DL (ref 0.5–1.4)
CREAT UR-MCNC: 122.8 MG/DL
EOSINOPHIL # BLD AUTO: 0.1 K/UL (ref 0–0.51)
EOSINOPHIL NFR BLD: 0.9 % (ref 0–6.9)
ERYTHROCYTE [DISTWIDTH] IN BLOOD BY AUTOMATED COUNT: 42.2 FL (ref 35.9–50)
GLOBULIN SER CALC-MCNC: 2.9 G/DL (ref 1.9–3.5)
GLUCOSE BLD-MCNC: 155 MG/DL (ref 65–99)
GLUCOSE SERPL-MCNC: 197 MG/DL (ref 65–99)
HCT VFR BLD AUTO: 39.1 % (ref 37–47)
HGB BLD-MCNC: 13.1 G/DL (ref 12–16)
IMM GRANULOCYTES # BLD AUTO: 0.15 K/UL (ref 0–0.11)
IMM GRANULOCYTES NFR BLD AUTO: 1.4 % (ref 0–0.9)
LYMPHOCYTES # BLD AUTO: 2.7 K/UL (ref 1–4.8)
LYMPHOCYTES NFR BLD: 25.3 % (ref 22–41)
MCH RBC QN AUTO: 31.1 PG (ref 27–33)
MCHC RBC AUTO-ENTMCNC: 33.5 G/DL (ref 33.6–35)
MCV RBC AUTO: 92.9 FL (ref 81.4–97.8)
MONOCYTES # BLD AUTO: 0.77 K/UL (ref 0–0.85)
MONOCYTES NFR BLD AUTO: 7.2 % (ref 0–13.4)
NEUTROPHILS # BLD AUTO: 6.9 K/UL (ref 2–7.15)
NEUTROPHILS NFR BLD: 64.7 % (ref 44–72)
NRBC # BLD AUTO: 0 K/UL
NRBC BLD-RTO: 0 /100 WBC
PLATELET # BLD AUTO: 189 K/UL (ref 164–446)
PMV BLD AUTO: 10.5 FL (ref 9–12.9)
POTASSIUM SERPL-SCNC: 3.8 MMOL/L (ref 3.6–5.5)
PROT SERPL-MCNC: 6.2 G/DL (ref 6–8.2)
PROT UR-MCNC: 28.2 MG/DL (ref 0–15)
PROT/CREAT UR: 230 MG/G (ref 10–107)
RBC # BLD AUTO: 4.21 M/UL (ref 4.2–5.4)
SODIUM SERPL-SCNC: 134 MMOL/L (ref 135–145)
URATE SERPL-MCNC: 3.8 MG/DL (ref 1.9–8.2)
WBC # BLD AUTO: 10.7 K/UL (ref 4.8–10.8)

## 2018-12-22 PROCEDURE — 85025 COMPLETE CBC W/AUTO DIFF WBC: CPT

## 2018-12-22 PROCEDURE — 82962 GLUCOSE BLOOD TEST: CPT

## 2018-12-22 PROCEDURE — 700111 HCHG RX REV CODE 636 W/ 250 OVERRIDE (IP): Performed by: OBSTETRICS & GYNECOLOGY

## 2018-12-22 PROCEDURE — 59025 FETAL NON-STRESS TEST: CPT

## 2018-12-22 PROCEDURE — 84156 ASSAY OF PROTEIN URINE: CPT

## 2018-12-22 PROCEDURE — 36415 COLL VENOUS BLD VENIPUNCTURE: CPT

## 2018-12-22 PROCEDURE — 700111 HCHG RX REV CODE 636 W/ 250 OVERRIDE (IP): Performed by: NURSE PRACTITIONER

## 2018-12-22 PROCEDURE — 84550 ASSAY OF BLOOD/URIC ACID: CPT

## 2018-12-22 PROCEDURE — 80053 COMPREHEN METABOLIC PANEL: CPT

## 2018-12-22 PROCEDURE — 96372 THER/PROPH/DIAG INJ SC/IM: CPT

## 2018-12-22 PROCEDURE — 82570 ASSAY OF URINE CREATININE: CPT

## 2018-12-22 RX ORDER — BETAMETHASONE SODIUM PHOSPHATE AND BETAMETHASONE ACETATE 3; 3 MG/ML; MG/ML
12 INJECTION, SUSPENSION INTRA-ARTICULAR; INTRALESIONAL; INTRAMUSCULAR; SOFT TISSUE ONCE
Status: COMPLETED | OUTPATIENT
Start: 2018-12-22 | End: 2018-12-22

## 2018-12-22 RX ADMIN — BETAMETHASONE SODIUM PHOSPHATE AND BETAMETHASONE ACETATE 12 MG: 3; 3 INJECTION, SUSPENSION INTRA-ARTICULAR; INTRALESIONAL; INTRAMUSCULAR at 01:29

## 2018-12-22 RX ADMIN — BETAMETHASONE SODIUM PHOSPHATE AND BETAMETHASONE ACETATE 12 MG: 3; 3 INJECTION, SUSPENSION INTRA-ARTICULAR; INTRALESIONAL; INTRAMUSCULAR at 20:35

## 2018-12-22 NOTE — PROGRESS NOTES
0028 -  here with EDC of 2/3/19 = 33.6 weeks reporting vaginal bleeding x1 tonight. Reports positive FM, denies UC's or LOF. Taken to LDA2 accompanied by family, instructed to change and call out when ready.   0033 - POC discussed, questions answered. VS taken, monitors applied x2. Small amount of blood seen on panty-liner.   0035 - report given to Dr. Jaswant Capps, orders received.   0125 - SSE done, no blood seen inside vaginal vault, cervical os visualized, appears closed.   0129 - betamethasone given (see MAR).   0142 - states she is feeling faint.   0144 - .   0150 - reports she is feeling better.   0303 - update given to Dr. Jaswant Capps, discharge order received.   030 - discharge instructions given as follows:  If you think you are in labor, time contractions (laying on your left side) from the beginning of one contraction to the beginning of the next contraction for at least one hour.   Increase fluid intake:10-12 8oz glasses non-caffeinated fluid per day.  Report any pressure or burning on urination to your physician.   Monitor fetal movement: You should be able to count 10 sets of movements in 2 hrs. If you notice an absence or marked decrease in fetal movement, call your physician or go to the hospital.   Report any sudden, sharp abdominal pain.   Report any bleeding, spotting or pinkish discharge is normal after vaginal exam and or sexual intercourse.   Call MD or return to unit if:  You have regular contractions that get progressively closer, longer and stronger.   Your water breaks (remember time and color).   You have bleeding like a period.  Decreased or absent fetal movement.   Pre-term Labor   Call your physician or return to the hospital if;  You have painless regular contractions more then 4 in one hour.   Your water breaks (note the time and color)   You have menstrual like cramps, a low dull back ache or pressure in your pelvis or back.   You do not feel your baby move fewer than 10 times  in 2 hr while you're doing you kick counts.   Your baby moves much less often then on the days before.   You have not felt your baby move all day.  High Blood Pressure  Rest on your right or left side.   Report any severe headaches, dizziness, blurred vision or spots before your eyes.   Report excessive swelling of your hands, face or feet.   Report epigastric pain (upper abdominal pain).   Questions answered, understanding verbalized.   0318 - discharged home in stable walking condition.

## 2018-12-23 NOTE — PROGRESS NOTES
" EDC 2019= 33w6d    Pt presents to L&D with FOB for scheduled second betamethasone injection. Pt received first dose after coming to L&D last night c/o VB. Using Swedish  #[id number] pt reports she went home last night and noticed an abrasion on her outer labia. She believes this is where the bleeding came from. She reports she experienced something similar with her last pregnancy. Pt denies continued bleeding/leaking, reports occasional ctx, reports baby moving normally. Pt asking if she needs second dose steroids. Discussed with pt advantages in fetal lung development should PTL occur but encouraged pt it is their decision. Pt reports she has GDM A2. External monitors applied, /83, set to cycle. Pt reports she \"normally runs high\", labs drawn yesterday. Pt asymptomatic. Discussed case with JOSE Marte, recommends second dose. Pt elects to receive second dose steroids     Second dose betamethasone given at 2035     Reactive tracing obtained, occasional ctx on monitors. Pt reports feeling good after receiving dose. Report to JOSE Marte, d/c order received     D/c instructions d/w pt and FOB, express understanding, questions answered. Pt has appointment at [facility] . Ambulatory off unit   "

## 2018-12-27 ENCOUNTER — ROUTINE PRENATAL (OUTPATIENT)
Dept: OBGYN | Facility: MEDICAL CENTER | Age: 28
End: 2018-12-27
Payer: COMMERCIAL

## 2018-12-27 VITALS — BODY MASS INDEX: 35.17 KG/M2 | SYSTOLIC BLOOD PRESSURE: 130 MMHG | WEIGHT: 224.1 LBS | DIASTOLIC BLOOD PRESSURE: 90 MMHG

## 2018-12-27 DIAGNOSIS — O24.419 GESTATIONAL DIABETES MELLITUS (GDM) IN THIRD TRIMESTER, GESTATIONAL DIABETES METHOD OF CONTROL UNSPECIFIED: ICD-10-CM

## 2018-12-27 DIAGNOSIS — O13.9 PREGNANCY INDUCED HYPERTENSION, ANTEPARTUM: ICD-10-CM

## 2018-12-27 DIAGNOSIS — O13.9 GESTATIONAL HYPERTENSION AFFECTING SECOND PREGNANCY: ICD-10-CM

## 2018-12-27 LAB
NST ACOUSTIC STIMULATION: NORMAL
NST ACTION NECESSARY: NORMAL
NST ASSESSMENT: NORMAL
NST BASELINE: NORMAL
NST INDICATIONS: NORMAL
NST OTHER DATA: NORMAL
NST READ BY: NORMAL
NST RETURN: NORMAL
NST UTERINE ACTIVITY: NORMAL

## 2018-12-27 PROCEDURE — 90040 PR PRENATAL FOLLOW UP: CPT | Performed by: OBSTETRICS & GYNECOLOGY

## 2018-12-27 PROCEDURE — 59025 FETAL NON-STRESS TEST: CPT | Performed by: OBSTETRICS & GYNECOLOGY

## 2018-12-27 RX ORDER — GLYBURIDE 2.5 MG/1
5 TABLET ORAL
Qty: 30 TAB | Refills: 1 | Status: SHIPPED | OUTPATIENT
Start: 2018-12-27

## 2018-12-27 NOTE — PROGRESS NOTES
Pt here today for OB follow up  Pt states no complaints  Reports +  Good # [phone]  Pharmacy Confirmed.  Interpreted ID:[id number]

## 2018-12-27 NOTE — PROGRESS NOTES
S: Pt presents for routine OB follow up.  No contractions, vaginal bleeding, or leaking fluids. Good fetal movement.  Was seen in triage on 12/12 for elevated BP and found to have normal blood work with PC ratio of 131. She was discharged and states the dizzy spells have resolved.   Denies BV, RUQ pain, HA, nausea.   On metformin 500/1000 and glyburide 2.5mg QHS    Questions answered.    O: /90   Wt 101.6 kg (224 lb 1.6 oz)   LMP 04/22/2018   BMI 35.17 kg/m²   Patients' weight gain, fluid intake and exercise level discussed.  Vitals, fundal height , fetal position, and FHR reviewed on flowsheet    Lab:  Recent Results (from the past 336 hour(s))   PROTEIN/CREAT RATIO URINE    Collection Time: 12/22/18  1:15 AM   Result Value Ref Range    Total Protein, Urine 28.2 (H) 0.0 - 15.0 mg/dL    Creatinine, Random Urine 122.80 mg/dL    Protein Creatinine Ratio 230 (H) 10 - 107 mg/g   CBC WITH DIFFERENTIAL    Collection Time: 12/22/18  1:23 AM   Result Value Ref Range    WBC 10.7 4.8 - 10.8 K/uL    RBC 4.21 4.20 - 5.40 M/uL    Hemoglobin 13.1 12.0 - 16.0 g/dL    Hematocrit 39.1 37.0 - 47.0 %    MCV 92.9 81.4 - 97.8 fL    MCH 31.1 27.0 - 33.0 pg    MCHC 33.5 (L) 33.6 - 35.0 g/dL    RDW 42.2 35.9 - 50.0 fL    Platelet Count 189 164 - 446 K/uL    MPV 10.5 9.0 - 12.9 fL    Neutrophils-Polys 64.70 44.00 - 72.00 %    Lymphocytes 25.30 22.00 - 41.00 %    Monocytes 7.20 0.00 - 13.40 %    Eosinophils 0.90 0.00 - 6.90 %    Basophils 0.50 0.00 - 1.80 %    Immature Granulocytes 1.40 (H) 0.00 - 0.90 %    Nucleated RBC 0.00 /100 WBC    Neutrophils (Absolute) 6.90 2.00 - 7.15 K/uL    Lymphs (Absolute) 2.70 1.00 - 4.80 K/uL    Monos (Absolute) 0.77 0.00 - 0.85 K/uL    Eos (Absolute) 0.10 0.00 - 0.51 K/uL    Baso (Absolute) 0.05 0.00 - 0.12 K/uL    Immature Granulocytes (abs) 0.15 (H) 0.00 - 0.11 K/uL    NRBC (Absolute) 0.00 K/uL   COMP METABOLIC PANEL    Collection Time: 12/22/18  1:23 AM   Result Value Ref Range    Sodium 134 (L)  135 - 145 mmol/L    Potassium 3.8 3.6 - 5.5 mmol/L    Chloride 107 96 - 112 mmol/L    Co2 15 (L) 20 - 33 mmol/L    Anion Gap 12.0 (H) 0.0 - 11.9    Glucose 197 (H) 65 - 99 mg/dL    Bun 9 8 - 22 mg/dL    Creatinine 0.51 0.50 - 1.40 mg/dL    Calcium 9.1 8.5 - 10.5 mg/dL    AST(SGOT) 9 (L) 12 - 45 U/L    ALT(SGPT) <5 2 - 50 U/L    Alkaline Phosphatase 98 30 - 99 U/L    Total Bilirubin 0.2 0.1 - 1.5 mg/dL    Albumin 3.3 3.2 - 4.9 g/dL    Total Protein 6.2 6.0 - 8.2 g/dL    Globulin 2.9 1.9 - 3.5 g/dL    A-G Ratio 1.1 g/dL   URIC ACID    Collection Time: 18  1:23 AM   Result Value Ref Range    Uric Acid 3.8 1.9 - 8.2 mg/dL   ESTIMATED GFR    Collection Time: 18  1:23 AM   Result Value Ref Range    GFR If African American >60 >60 mL/min/1.73 m 2    GFR If Non African American >60 >60 mL/min/1.73 m 2   ACCU-CHEK GLUCOSE    Collection Time: 18  1:44 AM   Result Value Ref Range    Glucose - Accu-Ck 155 (H) 65 - 99 mg/dL       Prenatal labs:  T&S: A negative -- not given rhogam yet  Hep B: neg  Rubella immune  RPR: neg  HIV: neg    Fastin-128 (8 elevated)  Breafast: 103-148 (2 elevated)  Lunch:  (2 elevated)  Dinner:  (none)    Level II: normal anatomty    Tdap given   A/P:  28 y.o.  at 34w4d with GDMA2 on metformin and elevated BP. Patient's last menstrual period was 2018. presents for routine obstetric follow-up.  Size equals dates and/or scan  Discussed the elevated fasting glucose: Cont meformin 500/1000. Increase glyburide to 5mg Qhs now  Continue prenatal vitamins.  Begin kick counts at 28 weeks.  Exercise at least 30 minutes daily.  Increase water intake.  Follow-up in 1 weeks.

## 2018-12-27 NOTE — PROGRESS NOTES
Twyla Simpson, a  at 34w4d with an LES of 2/3/2019, by Ultrasound, was seen at [facility] for a nonstress test.    Nonstress Test  Reason for NST: Diabetes  Variability: Moderate  Decelerations: None  Accelerations: No  Acoustic Stimulator: No  Baseline: 140  Uterine Irritability: No  Contractions: Not present  Nonstress Test Interpretation  Comments: reactive NST

## 2019-01-02 ENCOUNTER — ROUTINE PRENATAL (OUTPATIENT)
Dept: OBGYN | Facility: MEDICAL CENTER | Age: 29
End: 2019-01-02
Payer: COMMERCIAL

## 2019-01-02 ENCOUNTER — HOSPITAL ENCOUNTER (OUTPATIENT)
Facility: MEDICAL CENTER | Age: 29
End: 2019-01-02
Attending: OBSTETRICS & GYNECOLOGY
Payer: COMMERCIAL

## 2019-01-02 VITALS
SYSTOLIC BLOOD PRESSURE: 130 MMHG | DIASTOLIC BLOOD PRESSURE: 86 MMHG | WEIGHT: 228.18 LBS | BODY MASS INDEX: 35.81 KG/M2

## 2019-01-02 DIAGNOSIS — O09.93 ENCOUNTER FOR SUPERVISION OF HIGH RISK PREGNANCY IN THIRD TRIMESTER, ANTEPARTUM: ICD-10-CM

## 2019-01-02 DIAGNOSIS — O24.419 GDM, CLASS A2: ICD-10-CM

## 2019-01-02 PROCEDURE — 87150 DNA/RNA AMPLIFIED PROBE: CPT

## 2019-01-02 PROCEDURE — 90040 PR PRENATAL FOLLOW UP: CPT | Performed by: OBSTETRICS & GYNECOLOGY

## 2019-01-02 PROCEDURE — 59025 FETAL NON-STRESS TEST: CPT | Performed by: OBSTETRICS & GYNECOLOGY

## 2019-01-02 PROCEDURE — 87081 CULTURE SCREEN ONLY: CPT

## 2019-01-02 NOTE — PROGRESS NOTES
S: Pt with GDMA2 at 35w2d presents for routine OB follow up.  No contractions, vaginal bleeding, or leaking fluids. Good fetal movement.  Currently on metformin 500/1000 and glyburide 5mg qhs. States she has been trying to be better about evening carbs but the am sugars are still high. Also admits to increased carb intake on a few days over the holidays.   Complains of an irritated spot on her labia.   Questions answered.    Fastin, 100, 94, 126, 107  Breakfast: 195, 149, 133, 146, 171  Lunch: 168, 105, 131, 106, 135  Dinner: 92, 109, 129, 99    O: LMP 2018   Patients' weight gain, fluid intake and exercise level discussed.  Vitals, fundal height , fetal position, and FHR reviewed on flowsheet    SVE:  / floating  Left labia with follicular papule, likely folliculitis.   Fundus: 37cm    Lab:  Recent Results (from the past 336 hour(s))   PROTEIN/CREAT RATIO URINE    Collection Time: 18  1:15 AM   Result Value Ref Range    Total Protein, Urine 28.2 (H) 0.0 - 15.0 mg/dL    Creatinine, Random Urine 122.80 mg/dL    Protein Creatinine Ratio 230 (H) 10 - 107 mg/g   CBC WITH DIFFERENTIAL    Collection Time: 18  1:23 AM   Result Value Ref Range    WBC 10.7 4.8 - 10.8 K/uL    RBC 4.21 4.20 - 5.40 M/uL    Hemoglobin 13.1 12.0 - 16.0 g/dL    Hematocrit 39.1 37.0 - 47.0 %    MCV 92.9 81.4 - 97.8 fL    MCH 31.1 27.0 - 33.0 pg    MCHC 33.5 (L) 33.6 - 35.0 g/dL    RDW 42.2 35.9 - 50.0 fL    Platelet Count 189 164 - 446 K/uL    MPV 10.5 9.0 - 12.9 fL    Neutrophils-Polys 64.70 44.00 - 72.00 %    Lymphocytes 25.30 22.00 - 41.00 %    Monocytes 7.20 0.00 - 13.40 %    Eosinophils 0.90 0.00 - 6.90 %    Basophils 0.50 0.00 - 1.80 %    Immature Granulocytes 1.40 (H) 0.00 - 0.90 %    Nucleated RBC 0.00 /100 WBC    Neutrophils (Absolute) 6.90 2.00 - 7.15 K/uL    Lymphs (Absolute) 2.70 1.00 - 4.80 K/uL    Monos (Absolute) 0.77 0.00 - 0.85 K/uL    Eos (Absolute) 0.10 0.00 - 0.51 K/uL    Baso (Absolute) 0.05 0.00 -  0.12 K/uL    Immature Granulocytes (abs) 0.15 (H) 0.00 - 0.11 K/uL    NRBC (Absolute) 0.00 K/uL   COMP METABOLIC PANEL    Collection Time: 18  1:23 AM   Result Value Ref Range    Sodium 134 (L) 135 - 145 mmol/L    Potassium 3.8 3.6 - 5.5 mmol/L    Chloride 107 96 - 112 mmol/L    Co2 15 (L) 20 - 33 mmol/L    Anion Gap 12.0 (H) 0.0 - 11.9    Glucose 197 (H) 65 - 99 mg/dL    Bun 9 8 - 22 mg/dL    Creatinine 0.51 0.50 - 1.40 mg/dL    Calcium 9.1 8.5 - 10.5 mg/dL    AST(SGOT) 9 (L) 12 - 45 U/L    ALT(SGPT) <5 2 - 50 U/L    Alkaline Phosphatase 98 30 - 99 U/L    Total Bilirubin 0.2 0.1 - 1.5 mg/dL    Albumin 3.3 3.2 - 4.9 g/dL    Total Protein 6.2 6.0 - 8.2 g/dL    Globulin 2.9 1.9 - 3.5 g/dL    A-G Ratio 1.1 g/dL   URIC ACID    Collection Time: 18  1:23 AM   Result Value Ref Range    Uric Acid 3.8 1.9 - 8.2 mg/dL   ESTIMATED GFR    Collection Time: 18  1:23 AM   Result Value Ref Range    GFR If African American >60 >60 mL/min/1.73 m 2    GFR If Non African American >60 >60 mL/min/1.73 m 2   ACCU-CHEK GLUCOSE    Collection Time: 18  1:44 AM   Result Value Ref Range    Glucose - Accu-Ck 155 (H) 65 - 99 mg/dL   POCT Fetal Nonstress Test    Collection Time: 18 10:29 AM   Result Value Ref Range    NST Indications      NST Baseline      NST Uterine Activity      NST Acoustic Stimulation      NST Assessment      NST Action Necessary      NST Other Data      NST Return      NST Read By         Prenatal labs:  T&S: A negative -- rhogam given   Hep B: neg  Rubella immune  RPR: neg  HIV: neg  1hr: 161  3hr: 128 / 262/ 235 / 134 (failed)    Level II: normal anatomy    A/P:  28 y.o.  at 35w3d based on 17 wk US. Patient's last menstrual period was 2018. presents for routine obstetric follow-up. Size equals dates and/or scan  GBS today  NST today  Advised to use sitz baths and no to attempt to pop the area on the labia.   Increase dose of meds as follows: cont metformin 500/ 1000.  Increase glyburide to 7.5mg qhs  Continue prenatal vitamins.  Begin kick counts at 28 weeks.  Exercise at least 30 minutes daily.  Increase water intake.  Follow-up in 1 weeks.

## 2019-01-02 NOTE — PROGRESS NOTES
Twyla Simpson, a  at 35w3d with an LES of 2/3/2019, by Ultrasound, was seen at [facility] for a nonstress test.    Nonstress Test  Reason for NST: Diabetes  Variability: Moderate  Decelerations: None  Accelerations: Yes  Baseline: 140  Uterine Irritability: No  Contractions: Not present  Nonstress Test Interpretation  Comments: reactive NST

## 2019-01-02 NOTE — PROGRESS NOTES
Pt here today for OB follow up  Pt states no complaints  Reports +FM  Good # [phone]  Pharmacy Confirmed.  Chaperone offered and accepted.

## 2019-01-04 LAB — GP B STREP DNA SPEC QL NAA+PROBE: NEGATIVE

## 2019-01-09 ENCOUNTER — ROUTINE PRENATAL (OUTPATIENT)
Dept: OBGYN | Facility: MEDICAL CENTER | Age: 29
End: 2019-01-09
Payer: COMMERCIAL

## 2019-01-09 VITALS
WEIGHT: 227.29 LBS | DIASTOLIC BLOOD PRESSURE: 90 MMHG | BODY MASS INDEX: 35.67 KG/M2 | SYSTOLIC BLOOD PRESSURE: 126 MMHG

## 2019-01-09 DIAGNOSIS — Z86.32 HISTORY OF GESTATIONAL DIABETES MELLITUS (GDM): ICD-10-CM

## 2019-01-09 DIAGNOSIS — O13.9 PREGNANCY INDUCED HYPERTENSION, ANTEPARTUM: ICD-10-CM

## 2019-01-09 PROCEDURE — 59025 FETAL NON-STRESS TEST: CPT | Performed by: OBSTETRICS & GYNECOLOGY

## 2019-01-09 PROCEDURE — 90040 PR PRENATAL FOLLOW UP: CPT | Performed by: OBSTETRICS & GYNECOLOGY

## 2019-01-09 RX ORDER — PSEUDOEPHEDRINE HCL 30 MG
250 TABLET ORAL
Status: ON HOLD | COMMUNITY
Start: 2017-02-09 | End: 2019-01-17

## 2019-01-09 NOTE — PROGRESS NOTES
Twyla Simpson, a  at 36w3d with an LES of 2/3/2019, by Ultrasound, was seen at [facility] for a nonstress test.    Nonstress Test  Reason for NST: Diabetes  Variability: Moderate  Decelerations: None  Acoustic Stimulator: No  Baseline: 145  Uterine Irritability: No  Contractions: Irregular  Minutes Between Contractions: 10 min  Nonstress Test Interpretation  Comments: as per my read; category I NST

## 2019-01-09 NOTE — PROGRESS NOTES
Pt here today for OB follow up  Pt states she has been having increased discharge denies VB and LOF   Reports +FM  Good # [phone]  Pharmacy Confirmed.   ID # [id number]

## 2019-01-09 NOTE — PROGRESS NOTES
S: Pt presents for routine OB follow up.  No contractions, vaginal bleeding, or leaking fluids. Good fetal movement.  Pt has fitness tracker and 2 days ago states it showed her pulse was 130 and BP was 150/90 while at rest. C/o feeling a lot more heavy in the past week with cramping today.   Questions answered.    O: /90   Wt 103.1 kg (227 lb 4.7 oz)   LMP 2018   BMI 35.67 kg/m²   Patients' weight gain, fluid intake and exercise level discussed.  Vitals, fundal height , fetal position, and FHR reviewed on flowsheet    Fastin, 102, 92, 97, 88, 90, 102, 83  Bfast:  (3 out of range)  Lunch: 101-132   Dinner:  (2 out of range)    Lab:  Prenatal labs:  T&S: A negative -- rhogam given   Hep B: neg  Rubella immune  RPR: neg  HIV: neg  1hr: 161  3hr: 128 / 262/ 235 / 134 (failed)  GBS neg  S/p flu and tdap   Level II: normal anatomy    A/P:  28 y.o.  at 36w3d based on 17 wks US. Patient's last menstrual period was 2018. presents for routine obstetric follow-up. Size equals dates and/or scan  Cont dose of meds as follows: cont metformin 500/ 1000. Cont glyburide to 7.5mg qhs  PEC precautions given. Pt instructed that if Bps are high in the future to proceed to hospital  NST pending today  Increase water intake.  Follow-up in 1 weeks.

## 2019-01-12 ENCOUNTER — HOSPITAL ENCOUNTER (INPATIENT)
Facility: MEDICAL CENTER | Age: 29
LOS: 3 days | End: 2019-01-17
Attending: OBSTETRICS & GYNECOLOGY | Admitting: OBSTETRICS & GYNECOLOGY
Payer: COMMERCIAL

## 2019-01-12 LAB
ALBUMIN SERPL BCP-MCNC: 3.6 G/DL (ref 3.2–4.9)
ALBUMIN/GLOB SERPL: 1.1 G/DL
ALP SERPL-CCNC: 104 U/L (ref 30–99)
ALT SERPL-CCNC: 5 U/L (ref 2–50)
ANION GAP SERPL CALC-SCNC: 11 MMOL/L (ref 0–11.9)
APPEARANCE UR: CLEAR
AST SERPL-CCNC: 14 U/L (ref 12–45)
BASOPHILS # BLD AUTO: 0.4 % (ref 0–1.8)
BASOPHILS # BLD: 0.04 K/UL (ref 0–0.12)
BILIRUB SERPL-MCNC: 0.3 MG/DL (ref 0.1–1.5)
BUN SERPL-MCNC: 10 MG/DL (ref 8–22)
CALCIUM SERPL-MCNC: 9.3 MG/DL (ref 8.5–10.5)
CHLORIDE SERPL-SCNC: 108 MMOL/L (ref 96–112)
CO2 SERPL-SCNC: 17 MMOL/L (ref 20–33)
COLOR UR AUTO: YELLOW
CREAT SERPL-MCNC: 0.56 MG/DL (ref 0.5–1.4)
EOSINOPHIL # BLD AUTO: 0.06 K/UL (ref 0–0.51)
EOSINOPHIL NFR BLD: 0.6 % (ref 0–6.9)
ERYTHROCYTE [DISTWIDTH] IN BLOOD BY AUTOMATED COUNT: 43.7 FL (ref 35.9–50)
GLOBULIN SER CALC-MCNC: 3.3 G/DL (ref 1.9–3.5)
GLUCOSE SERPL-MCNC: 111 MG/DL (ref 65–99)
GLUCOSE UR QL STRIP.AUTO: NEGATIVE MG/DL
HCT VFR BLD AUTO: 41.7 % (ref 37–47)
HGB BLD-MCNC: 13.2 G/DL (ref 12–16)
HOLDING TUBE BB 8507: NORMAL
IMM GRANULOCYTES # BLD AUTO: 0.06 K/UL (ref 0–0.11)
IMM GRANULOCYTES NFR BLD AUTO: 0.6 % (ref 0–0.9)
KETONES UR QL STRIP.AUTO: 15 MG/DL
LEUKOCYTE ESTERASE UR QL STRIP.AUTO: NEGATIVE
LYMPHOCYTES # BLD AUTO: 2.51 K/UL (ref 1–4.8)
LYMPHOCYTES NFR BLD: 26.3 % (ref 22–41)
MCH RBC QN AUTO: 29.3 PG (ref 27–33)
MCHC RBC AUTO-ENTMCNC: 31.7 G/DL (ref 33.6–35)
MCV RBC AUTO: 92.5 FL (ref 81.4–97.8)
MONOCYTES # BLD AUTO: 0.7 K/UL (ref 0–0.85)
MONOCYTES NFR BLD AUTO: 7.3 % (ref 0–13.4)
NEUTROPHILS # BLD AUTO: 6.17 K/UL (ref 2–7.15)
NEUTROPHILS NFR BLD: 64.8 % (ref 44–72)
NITRITE UR QL STRIP.AUTO: NEGATIVE
NRBC # BLD AUTO: 0 K/UL
NRBC BLD-RTO: 0 /100 WBC
PH UR STRIP.AUTO: 5.5 [PH]
PLATELET # BLD AUTO: 201 K/UL (ref 164–446)
PMV BLD AUTO: 10.7 FL (ref 9–12.9)
POTASSIUM SERPL-SCNC: 5.3 MMOL/L (ref 3.6–5.5)
PROT SERPL-MCNC: 6.9 G/DL (ref 6–8.2)
PROT UR QL STRIP: NEGATIVE MG/DL
RBC # BLD AUTO: 4.51 M/UL (ref 4.2–5.4)
RBC UR QL AUTO: NEGATIVE
SODIUM SERPL-SCNC: 136 MMOL/L (ref 135–145)
SP GR UR: 1.02
URATE SERPL-MCNC: 4.8 MG/DL (ref 1.9–8.2)
WBC # BLD AUTO: 9.5 K/UL (ref 4.8–10.8)

## 2019-01-12 PROCEDURE — 81002 URINALYSIS NONAUTO W/O SCOPE: CPT

## 2019-01-12 PROCEDURE — 85025 COMPLETE CBC W/AUTO DIFF WBC: CPT

## 2019-01-12 PROCEDURE — 84550 ASSAY OF BLOOD/URIC ACID: CPT

## 2019-01-12 PROCEDURE — 80053 COMPREHEN METABOLIC PANEL: CPT

## 2019-01-12 PROCEDURE — 700102 HCHG RX REV CODE 250 W/ 637 OVERRIDE(OP): Performed by: OBSTETRICS & GYNECOLOGY

## 2019-01-12 PROCEDURE — G0378 HOSPITAL OBSERVATION PER HR: HCPCS

## 2019-01-12 PROCEDURE — A9270 NON-COVERED ITEM OR SERVICE: HCPCS | Performed by: OBSTETRICS & GYNECOLOGY

## 2019-01-12 RX ORDER — VALACYCLOVIR HYDROCHLORIDE 500 MG/1
1000 TABLET, FILM COATED ORAL 2 TIMES DAILY
Status: DISCONTINUED | OUTPATIENT
Start: 2019-01-12 | End: 2019-01-17 | Stop reason: HOSPADM

## 2019-01-12 RX ORDER — SODIUM CHLORIDE 9 MG/ML
INJECTION, SOLUTION INTRAVENOUS CONTINUOUS
Status: DISCONTINUED | OUTPATIENT
Start: 2019-01-12 | End: 2019-01-17 | Stop reason: HOSPADM

## 2019-01-12 RX ADMIN — VALACYCLOVIR HYDROCHLORIDE 1000 MG: 500 TABLET, FILM COATED ORAL at 23:30

## 2019-01-13 LAB
ALBUMIN SERPL BCP-MCNC: 3.1 G/DL (ref 3.2–4.9)
ALBUMIN/GLOB SERPL: 1.2 G/DL
ALP SERPL-CCNC: 100 U/L (ref 30–99)
ALT SERPL-CCNC: <5 U/L (ref 2–50)
ANION GAP SERPL CALC-SCNC: 9 MMOL/L (ref 0–11.9)
AST SERPL-CCNC: 7 U/L (ref 12–45)
BILIRUB SERPL-MCNC: 0.3 MG/DL (ref 0.1–1.5)
BUN SERPL-MCNC: 8 MG/DL (ref 8–22)
CALCIUM SERPL-MCNC: 8.7 MG/DL (ref 8.5–10.5)
CHLORIDE SERPL-SCNC: 107 MMOL/L (ref 96–112)
CO2 SERPL-SCNC: 20 MMOL/L (ref 20–33)
CREAT SERPL-MCNC: 0.55 MG/DL (ref 0.5–1.4)
ERYTHROCYTE [DISTWIDTH] IN BLOOD BY AUTOMATED COUNT: 43.7 FL (ref 35.9–50)
GLOBULIN SER CALC-MCNC: 2.5 G/DL (ref 1.9–3.5)
GLUCOSE BLD-MCNC: 103 MG/DL (ref 65–99)
GLUCOSE BLD-MCNC: 106 MG/DL (ref 65–99)
GLUCOSE BLD-MCNC: 109 MG/DL (ref 65–99)
GLUCOSE BLD-MCNC: 137 MG/DL (ref 65–99)
GLUCOSE BLD-MCNC: 96 MG/DL (ref 65–99)
GLUCOSE SERPL-MCNC: 103 MG/DL (ref 65–99)
HCT VFR BLD AUTO: 36.8 % (ref 37–47)
HGB BLD-MCNC: 12.1 G/DL (ref 12–16)
MCH RBC QN AUTO: 30.6 PG (ref 27–33)
MCHC RBC AUTO-ENTMCNC: 32.9 G/DL (ref 33.6–35)
MCV RBC AUTO: 93.2 FL (ref 81.4–97.8)
PLATELET # BLD AUTO: 183 K/UL (ref 164–446)
PMV BLD AUTO: 10.9 FL (ref 9–12.9)
POTASSIUM SERPL-SCNC: 3.6 MMOL/L (ref 3.6–5.5)
PROT SERPL-MCNC: 5.6 G/DL (ref 6–8.2)
RBC # BLD AUTO: 3.95 M/UL (ref 4.2–5.4)
SODIUM SERPL-SCNC: 136 MMOL/L (ref 135–145)
URATE SERPL-MCNC: 5.5 MG/DL (ref 1.9–8.2)
WBC # BLD AUTO: 9.4 K/UL (ref 4.8–10.8)

## 2019-01-13 PROCEDURE — A9270 NON-COVERED ITEM OR SERVICE: HCPCS | Performed by: OBSTETRICS & GYNECOLOGY

## 2019-01-13 PROCEDURE — 84156 ASSAY OF PROTEIN URINE: CPT

## 2019-01-13 PROCEDURE — 85027 COMPLETE CBC AUTOMATED: CPT

## 2019-01-13 PROCEDURE — G0378 HOSPITAL OBSERVATION PER HR: HCPCS

## 2019-01-13 PROCEDURE — 700102 HCHG RX REV CODE 250 W/ 637 OVERRIDE(OP): Performed by: OBSTETRICS & GYNECOLOGY

## 2019-01-13 PROCEDURE — 84550 ASSAY OF BLOOD/URIC ACID: CPT

## 2019-01-13 PROCEDURE — 36415 COLL VENOUS BLD VENIPUNCTURE: CPT

## 2019-01-13 PROCEDURE — 59025 FETAL NON-STRESS TEST: CPT

## 2019-01-13 PROCEDURE — 81050 URINALYSIS VOLUME MEASURE: CPT

## 2019-01-13 PROCEDURE — 82962 GLUCOSE BLOOD TEST: CPT | Mod: 91

## 2019-01-13 PROCEDURE — 80053 COMPREHEN METABOLIC PANEL: CPT

## 2019-01-13 RX ORDER — CALCIUM CARBONATE 500 MG/1
500 TABLET, CHEWABLE ORAL PRN
COMMUNITY

## 2019-01-13 RX ORDER — CALCIUM CARBONATE 500 MG/1
500 TABLET, CHEWABLE ORAL
Status: DISCONTINUED | OUTPATIENT
Start: 2019-01-13 | End: 2019-01-17 | Stop reason: HOSPADM

## 2019-01-13 RX ORDER — ALUMINA, MAGNESIA, AND SIMETHICONE 2400; 2400; 240 MG/30ML; MG/30ML; MG/30ML
10 SUSPENSION ORAL 4 TIMES DAILY PRN
Status: DISCONTINUED | OUTPATIENT
Start: 2019-01-13 | End: 2019-01-17 | Stop reason: HOSPADM

## 2019-01-13 RX ADMIN — METFORMIN HYDROCHLORIDE 500 MG: 500 TABLET ORAL at 08:14

## 2019-01-13 RX ADMIN — GLYBURIDE 7.5 MG: 5 TABLET ORAL at 21:25

## 2019-01-13 RX ADMIN — VALACYCLOVIR HYDROCHLORIDE 1000 MG: 500 TABLET, FILM COATED ORAL at 18:29

## 2019-01-13 RX ADMIN — ANTACID TABLETS 500 MG: 500 TABLET, CHEWABLE ORAL at 02:00

## 2019-01-13 RX ADMIN — VALACYCLOVIR HYDROCHLORIDE 1000 MG: 500 TABLET, FILM COATED ORAL at 06:31

## 2019-01-13 RX ADMIN — METFORMIN HYDROCHLORIDE 1000 MG: 500 TABLET, FILM COATED ORAL at 18:36

## 2019-01-13 ASSESSMENT — PATIENT HEALTH QUESTIONNAIRE - PHQ9
1. LITTLE INTEREST OR PLEASURE IN DOING THINGS: NOT AT ALL
SUM OF ALL RESPONSES TO PHQ9 QUESTIONS 1 AND 2: 0
2. FEELING DOWN, DEPRESSED, IRRITABLE, OR HOPELESS: NOT AT ALL
1. LITTLE INTEREST OR PLEASURE IN DOING THINGS: NOT AT ALL
2. FEELING DOWN, DEPRESSED, IRRITABLE, OR HOPELESS: NOT AT ALL
SUM OF ALL RESPONSES TO PHQ9 QUESTIONS 1 AND 2: 0

## 2019-01-13 ASSESSMENT — PAIN SCALES - GENERAL
PAINLEVEL_OUTOF10: 5
PAINLEVEL_OUTOF10: 0

## 2019-01-13 NOTE — H&P
History and Physical    Twyla Simpson is a 28 y.o. female  at 36w6d who presents with increased Bps at home. She has a history of a previous delivery at term complicated by preeclampsia. During this pregnancy she has had intermittently elevated Bps, mostly 130-140/70-80 without significant proteinuria or lab abnormalities. Recently, pt was seen for BP issues on  and had 2 doses of betamethasone during that episode. 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 She states the pharmacy did not have the medication and she has not been taking her prophylactic dose of meds for almost 2 weeks. Patient admits to recently having a painful lesion on her right labia majora.     Subjective:   CTXs: negative   Pain: negative  LOF: negative  Vaginal bleeding: negative   Fetal movement: negative    ROS: Pertinent positives documented in HPI and all other systems reviewed & are negative    OB History    Para Term  AB Living   3 1 1     1   SAB TAB Ectopic Molar Multiple Live Births             1      # Outcome Date GA Lbr Alfred/2nd Weight Sex Delivery Anes PTL Lv   3 Current            2 Term 17 40w0d  3.969 kg (8 lb 12 oz) M Vag-Spont  N YUSUF      Complications: Pre-eclampsia   1                    PGYNHx: HSV 2 as above, last out break in this pregnancy which was treated    No past medical history on file.    No past surgical history on file.      Current Facility-Administered Medications:   •  valACYclovir (VALTREX) caplet 1,000 mg, 1,000 mg, Oral, BID, Alvaro Diallo D.O.    Allergies: Nsaids and Tolmetin    Social History     Social History   • Marital status:      Spouse name: N/A   • Number of children: N/A   • Years of education: N/A     Occupational History   • Not on file.     Social History Main Topics   • Smoking status: Never Smoker   • Smokeless tobacco: Not on file   • Alcohol use No   • Drug use: No   • Sexual  activity: Yes     Partners: Male     Other Topics Concern   • Not on file     Social History Narrative   • No narrative on file       Prenatal care with [facility] with following problems:  Patient Active Problem List    Diagnosis Date Noted   • Encounter for supervision of high risk pregnancy in third trimester, antepartum 01/02/2019   • Pregnancy induced hypertension, antepartum 12/27/2018   • Rh negative state in antepartum period 11/26/2018   • Gestational diabetes 11/21/2018   • 23 weeks gestation of pregnancy 10/11/2018   • History of gestational diabetes mellitus (GDM) 10/11/2018   Pt declined prenatal testing for chromosomal anomalies  Rh negative s/p rhogam      Objective:      Blood pressure 153/104, pulse (!) 101, temperature 37.2 °C (98.9 °F), temperature source Temporal, weight 103.1 kg (227 lb 4.7 oz), last menstrual period 04/22/2018, not currently breastfeeding.    General:   no acute distress, alert, cooperative   Skin:   ulcerated lesion noted on right labia    HEENT:  PERRLA   Lungs:   CTA bilateral   Heart:   S1, S2 normal, no murmur, click, rub or gallop, regular rate and rhythm, chest is clear without rales or wheezing, no pedal edema, S1, S2 normal, no murmur, regular rate and rhythm   Abdomen:   soft, gravid, NT   EFW:  3500   Pelvis:  adequate with gynecoid pelvis   FHT:  150 / mod grace / reactive   Uterine Size: S=D   Presentations: Cephalic   Cervix: deferred                              Lab Review  Lab:   Blood type: A     Recent Results (from the past 5880 hour(s))   POCT US - In Clinic OB Scan    Collection Time: 08/31/18  9:01 AM   Result Value Ref Range    In Clinic OB Scan     CHLAMYDIA/GC PCR URINE OR SWAB    Collection Time: 09/13/18 11:51 AM   Result Value Ref Range    Source Genital     C. trachomatis by PCR Negative Negative    N. gonorrhoeae by PCR Negative Negative   PRENATAL PANEL 3+HIV+UACXI    Collection Time: 09/13/18  3:20 PM   Result Value Ref Range    WBC 13.5 (H) 4.8 - 10.8  K/uL    RBC 4.28 4.20 - 5.40 M/uL    Hemoglobin 13.5 12.0 - 16.0 g/dL    Hematocrit 40.4 37.0 - 47.0 %    MCV 94.4 81.4 - 97.8 fL    MCH 31.5 27.0 - 33.0 pg    MCHC 33.4 (L) 33.6 - 35.0 g/dL    RDW 42.5 35.9 - 50.0 fL    Platelet Count 241 164 - 446 K/uL    MPV 11.0 9.0 - 12.9 fL    Neutrophils-Polys 68.80 44.00 - 72.00 %    Lymphocytes 24.80 22.00 - 41.00 %    Monocytes 3.90 0.00 - 13.40 %    Eosinophils 0.80 0.00 - 6.90 %    Basophils 0.40 0.00 - 1.80 %    Immature Granulocytes 1.30 (H) 0.00 - 0.90 %    Nucleated RBC 0.00 /100 WBC    Neutrophils (Absolute) 9.26 (H) 2.00 - 7.15 K/uL    Lymphs (Absolute) 3.34 1.00 - 4.80 K/uL    Monos (Absolute) 0.52 0.00 - 0.85 K/uL    Eos (Absolute) 0.11 0.00 - 0.51 K/uL    Baso (Absolute) 0.06 0.00 - 0.12 K/uL    Immature Granulocytes (abs) 0.18 (H) 0.00 - 0.11 K/uL    NRBC (Absolute) 0.00 K/uL    Color Yellow     Character Cloudy (A)     Specific Gravity 1.026 <1.035    Ph 6.0 5.0 - 8.0    Glucose 100 (A) Negative mg/dL    Ketones Negative Negative mg/dL    Protein Negative Negative mg/dL    Bilirubin Negative Negative    Urobilinogen, Urine 0.2 Negative    Nitrite Negative Negative    Leukocyte Esterase Trace (A) Negative    Occult Blood Negative Negative    Micro Urine Req Microscopic     Rubella IgG Antibody >500.0 IU/mL    Hepatitis B Surface Antigen Negative Negative    Syphilis, Treponemal Qual Non Reactive Non Reactive   GLUCOSE 1HR GESTATIONAL    Collection Time: 09/13/18  3:20 PM   Result Value Ref Range    Glucose, Post Dose 161 (H) 70 - 139 mg/dL   HIV AG/AB COMBO ASSAY SCREENING    Collection Time: 09/13/18  3:20 PM   Result Value Ref Range    HIV Ag/Ab Combo Assay Non Reactive Non Reactive   OP PRENATAL PANEL-BLOOD BANK    Collection Time: 09/13/18  3:20 PM   Result Value Ref Range    ABO Grouping Only A     Rh Grouping Only NEG     Antibody Screen Scrn NEG    URINE MICROSCOPIC (W/UA)    Collection Time: 09/13/18  3:20 PM   Result Value Ref Range    WBC 0-2 /hpf     Bacteria Moderate (A) None /hpf    Epithelial Cells Moderate (A) /hpf    Ca Oxalate Crystal Many /hpf   GLUCOSE 3 HR GESTATIONAL    Collection Time: 10/12/18  9:00 AM   Result Value Ref Range    Glucose 2 Hour 235 (H) 65 - 155 mg/dL    Glucose 3 Hour 134 65 - 140 mg/dL    Glucose 1 Hour 262 (H) 65 - 180 mg/dL    Baseline Glucose 128 (H) 65 - 95 mg/dL   URINALYSIS    Collection Time: 11/08/18 11:00 AM   Result Value Ref Range    Color Yellow     Character Cloudy (A)     Specific Gravity 1.034 <1.035    Ph 5.0 5.0 - 8.0    Glucose 500 (A) Negative mg/dL    Ketones Trace (A) Negative mg/dL    Protein Negative Negative mg/dL    Bilirubin Negative Negative    Urobilinogen, Urine 0.2 Negative    Nitrite Negative Negative    Leukocyte Esterase Negative Negative    Occult Blood Negative Negative    Micro Urine Req Microscopic    PROTEIN/CREAT RATIO URINE    Collection Time: 11/08/18 11:00 AM   Result Value Ref Range    Total Protein, Urine 31.3 (H) 0.0 - 15.0 mg/dL    Creatinine, Random Urine 235.10 mg/dL    Protein Creatinine Ratio 133 (H) 10 - 107 mg/g   URINE MICROSCOPIC (W/UA)    Collection Time: 11/08/18 11:00 AM   Result Value Ref Range    WBC 0-2 /hpf    Bacteria Negative None /hpf    Epithelial Cells Few /hpf    Mucous Threads Few /hpf    Ca Oxalate Crystal Moderate /hpf    Hyaline Cast 0-2 /lpf   URINETOTAL PROTEIN 24 HR    Collection Time: 11/08/18 11:00 AM   Result Value Ref Range    Total Protein, Urine 8.1 0.0 - 15.0 mg/dL   CBC WITH DIFFERENTIAL    Collection Time: 11/08/18 11:39 AM   Result Value Ref Range    WBC 10.6 4.8 - 10.8 K/uL    RBC 4.13 (L) 4.20 - 5.40 M/uL    Hemoglobin 12.7 12.0 - 16.0 g/dL    Hematocrit 39.2 37.0 - 47.0 %    MCV 94.9 81.4 - 97.8 fL    MCH 30.8 27.0 - 33.0 pg    MCHC 32.4 (L) 33.6 - 35.0 g/dL    RDW 42.7 35.9 - 50.0 fL    Platelet Count 208 164 - 446 K/uL    MPV 11.1 9.0 - 12.9 fL    Neutrophils-Polys 70.50 44.00 - 72.00 %    Lymphocytes 22.30 22.00 - 41.00 %    Monocytes 4.90 0.00  - 13.40 %    Eosinophils 0.60 0.00 - 6.90 %    Basophils 0.40 0.00 - 1.80 %    Immature Granulocytes 1.30 (H) 0.00 - 0.90 %    Nucleated RBC 0.00 /100 WBC    Neutrophils (Absolute) 7.51 (H) 2.00 - 7.15 K/uL    Lymphs (Absolute) 2.37 1.00 - 4.80 K/uL    Monos (Absolute) 0.52 0.00 - 0.85 K/uL    Eos (Absolute) 0.06 0.00 - 0.51 K/uL    Baso (Absolute) 0.04 0.00 - 0.12 K/uL    Immature Granulocytes (abs) 0.14 (H) 0.00 - 0.11 K/uL    NRBC (Absolute) 0.00 K/uL   COMP METABOLIC PANEL    Collection Time: 11/08/18 11:39 AM   Result Value Ref Range    Sodium 135 135 - 145 mmol/L    Potassium 3.8 3.6 - 5.5 mmol/L    Chloride 106 96 - 112 mmol/L    Co2 21 20 - 33 mmol/L    Anion Gap 8.0 0.0 - 11.9    Glucose 117 (H) 65 - 99 mg/dL    Bun 8 8 - 22 mg/dL    Creatinine 0.44 (L) 0.50 - 1.40 mg/dL    Calcium 9.2 8.5 - 10.5 mg/dL    AST(SGOT) 8 (L) 12 - 45 U/L    ALT(SGPT) <5 2 - 50 U/L    Alkaline Phosphatase 60 30 - 99 U/L    Total Bilirubin 0.2 0.1 - 1.5 mg/dL    Albumin 3.5 3.2 - 4.9 g/dL    Total Protein 6.2 6.0 - 8.2 g/dL    Globulin 2.7 1.9 - 3.5 g/dL    A-G Ratio 1.3 g/dL   URIC ACID    Collection Time: 11/08/18 11:39 AM   Result Value Ref Range    Uric Acid 4.1 1.9 - 8.2 mg/dL   HEMOGLOBIN A1C    Collection Time: 11/08/18 11:39 AM   Result Value Ref Range    Glycohemoglobin 6.3 (H) 0.0 - 5.6 %    Est Avg Glucose 134 mg/dL   ESTIMATED GFR    Collection Time: 11/08/18 11:39 AM   Result Value Ref Range    GFR If African American >60 >60 mL/min/1.73 m 2    GFR If Non African American >60 >60 mL/min/1.73 m 2   POCT Urinalysis    Collection Time: 12/12/18  3:54 PM   Result Value Ref Range    POC Color Dark yellow Negative    POC Appearance slightly cloudly Negative    POC Leukocyte Esterase Trace Negative    POC Nitrites Neg Negative    POC Urobiligen  Negative (0.2) mg/dL    POC Protein Neg Negative mg/dL    POC Urine PH 6.0 5.0 - 8.0    POC Blood Trace Negative    POC Specific Gravity 1.030 <1.005 - >1.030    POC Ketones Neg  Negative mg/dL    POC Bilirubin  Negative mg/dL    POC Glucose Neg Negative mg/dL   PROTEIN/CREAT RATIO URINE    Collection Time: 12/12/18  5:40 PM   Result Value Ref Range    Total Protein, Urine 18.3 (H) 0.0 - 15.0 mg/dL    Creatinine, Random Urine 139.80 mg/dL    Protein Creatinine Ratio 131 (H) 10 - 107 mg/g   CBC WITH DIFFERENTIAL    Collection Time: 12/12/18  6:24 PM   Result Value Ref Range    WBC 9.6 4.8 - 10.8 K/uL    RBC 3.97 (L) 4.20 - 5.40 M/uL    Hemoglobin 12.2 12.0 - 16.0 g/dL    Hematocrit 37.7 37.0 - 47.0 %    MCV 95.0 81.4 - 97.8 fL    MCH 30.7 27.0 - 33.0 pg    MCHC 32.4 (L) 33.6 - 35.0 g/dL    RDW 42.8 35.9 - 50.0 fL    Platelet Count 191 164 - 446 K/uL    MPV 10.6 9.0 - 12.9 fL    Neutrophils-Polys 67.10 44.00 - 72.00 %    Lymphocytes 25.80 22.00 - 41.00 %    Monocytes 5.40 0.00 - 13.40 %    Eosinophils 0.60 0.00 - 6.90 %    Basophils 0.30 0.00 - 1.80 %    Immature Granulocytes 0.80 0.00 - 0.90 %    Nucleated RBC 0.00 /100 WBC    Neutrophils (Absolute) 6.40 2.00 - 7.15 K/uL    Lymphs (Absolute) 2.46 1.00 - 4.80 K/uL    Monos (Absolute) 0.52 0.00 - 0.85 K/uL    Eos (Absolute) 0.06 0.00 - 0.51 K/uL    Baso (Absolute) 0.03 0.00 - 0.12 K/uL    Immature Granulocytes (abs) 0.08 0.00 - 0.11 K/uL    NRBC (Absolute) 0.00 K/uL   COMP METABOLIC PANEL    Collection Time: 12/12/18  6:24 PM   Result Value Ref Range    Sodium 138 135 - 145 mmol/L    Potassium 3.8 3.6 - 5.5 mmol/L    Chloride 108 96 - 112 mmol/L    Co2 20 20 - 33 mmol/L    Anion Gap 10.0 0.0 - 11.9    Glucose 154 (H) 65 - 99 mg/dL    Bun 8 8 - 22 mg/dL    Creatinine 0.53 0.50 - 1.40 mg/dL    Calcium 8.6 8.5 - 10.5 mg/dL    AST(SGOT) 11 (L) 12 - 45 U/L    ALT(SGPT) 5 2 - 50 U/L    Alkaline Phosphatase 84 30 - 99 U/L    Total Bilirubin 0.3 0.1 - 1.5 mg/dL    Albumin 3.4 3.2 - 4.9 g/dL    Total Protein 5.9 (L) 6.0 - 8.2 g/dL    Globulin 2.5 1.9 - 3.5 g/dL    A-G Ratio 1.4 g/dL   ESTIMATED GFR    Collection Time: 12/12/18  6:24 PM   Result Value  Ref Range    GFR If African American >60 >60 mL/min/1.73 m 2    GFR If Non African American >60 >60 mL/min/1.73 m 2   PROTEIN/CREAT RATIO URINE    Collection Time: 12/22/18  1:15 AM   Result Value Ref Range    Total Protein, Urine 28.2 (H) 0.0 - 15.0 mg/dL    Creatinine, Random Urine 122.80 mg/dL    Protein Creatinine Ratio 230 (H) 10 - 107 mg/g   CBC WITH DIFFERENTIAL    Collection Time: 12/22/18  1:23 AM   Result Value Ref Range    WBC 10.7 4.8 - 10.8 K/uL    RBC 4.21 4.20 - 5.40 M/uL    Hemoglobin 13.1 12.0 - 16.0 g/dL    Hematocrit 39.1 37.0 - 47.0 %    MCV 92.9 81.4 - 97.8 fL    MCH 31.1 27.0 - 33.0 pg    MCHC 33.5 (L) 33.6 - 35.0 g/dL    RDW 42.2 35.9 - 50.0 fL    Platelet Count 189 164 - 446 K/uL    MPV 10.5 9.0 - 12.9 fL    Neutrophils-Polys 64.70 44.00 - 72.00 %    Lymphocytes 25.30 22.00 - 41.00 %    Monocytes 7.20 0.00 - 13.40 %    Eosinophils 0.90 0.00 - 6.90 %    Basophils 0.50 0.00 - 1.80 %    Immature Granulocytes 1.40 (H) 0.00 - 0.90 %    Nucleated RBC 0.00 /100 WBC    Neutrophils (Absolute) 6.90 2.00 - 7.15 K/uL    Lymphs (Absolute) 2.70 1.00 - 4.80 K/uL    Monos (Absolute) 0.77 0.00 - 0.85 K/uL    Eos (Absolute) 0.10 0.00 - 0.51 K/uL    Baso (Absolute) 0.05 0.00 - 0.12 K/uL    Immature Granulocytes (abs) 0.15 (H) 0.00 - 0.11 K/uL    NRBC (Absolute) 0.00 K/uL   COMP METABOLIC PANEL    Collection Time: 12/22/18  1:23 AM   Result Value Ref Range    Sodium 134 (L) 135 - 145 mmol/L    Potassium 3.8 3.6 - 5.5 mmol/L    Chloride 107 96 - 112 mmol/L    Co2 15 (L) 20 - 33 mmol/L    Anion Gap 12.0 (H) 0.0 - 11.9    Glucose 197 (H) 65 - 99 mg/dL    Bun 9 8 - 22 mg/dL    Creatinine 0.51 0.50 - 1.40 mg/dL    Calcium 9.1 8.5 - 10.5 mg/dL    AST(SGOT) 9 (L) 12 - 45 U/L    ALT(SGPT) <5 2 - 50 U/L    Alkaline Phosphatase 98 30 - 99 U/L    Total Bilirubin 0.2 0.1 - 1.5 mg/dL    Albumin 3.3 3.2 - 4.9 g/dL    Total Protein 6.2 6.0 - 8.2 g/dL    Globulin 2.9 1.9 - 3.5 g/dL    A-G Ratio 1.1 g/dL   URIC ACID     Collection Time: 12/22/18  1:23 AM   Result Value Ref Range    Uric Acid 3.8 1.9 - 8.2 mg/dL   ESTIMATED GFR    Collection Time: 12/22/18  1:23 AM   Result Value Ref Range    GFR If African American >60 >60 mL/min/1.73 m 2    GFR If Non African American >60 >60 mL/min/1.73 m 2   ACCU-CHEK GLUCOSE    Collection Time: 12/22/18  1:44 AM   Result Value Ref Range    Glucose - Accu-Ck 155 (H) 65 - 99 mg/dL   POCT Fetal Nonstress Test    Collection Time: 12/27/18 10:29 AM   Result Value Ref Range    NST Indications      NST Baseline      NST Uterine Activity      NST Acoustic Stimulation      NST Assessment      NST Action Necessary      NST Other Data      NST Return      NST Read By     POCT Fetal Nonstress Test    Collection Time: 01/02/19  2:03 PM   Result Value Ref Range    NST Indications      NST Baseline      NST Uterine Activity      NST Acoustic Stimulation      NST Assessment      NST Action Necessary      NST Other Data      NST Return      NST Read By     GRP B STREP, BY PCR (LINO BROTH)    Collection Time: 01/02/19  2:06 PM   Result Value Ref Range    Strep Gp B DNA PCR Negative Negative   POC UA    Collection Time: 01/12/19  8:01 PM   Result Value Ref Range    POC Color Yellow     POC Appearance Clear     POC Glucose Negative Negative mg/dL    POC Ketones 15 (A) Negative mg/dL    POC Specific Gravity 1.025 1.005 - 1.030    POC Blood Negative Negative    POC Urine PH 5.5 5.0 - 8.0    POC Protein Negative Negative mg/dL    POC Nitrites Negative Negative    POC Leukocyte Esterase Negative Negative   CBC WITH DIFFERENTIAL    Collection Time: 01/12/19  8:08 PM   Result Value Ref Range    WBC 9.5 4.8 - 10.8 K/uL    RBC 4.51 4.20 - 5.40 M/uL    Hemoglobin 13.2 12.0 - 16.0 g/dL    Hematocrit 41.7 37.0 - 47.0 %    MCV 92.5 81.4 - 97.8 fL    MCH 29.3 27.0 - 33.0 pg    MCHC 31.7 (L) 33.6 - 35.0 g/dL    RDW 43.7 35.9 - 50.0 fL    Platelet Count 201 164 - 446 K/uL    MPV 10.7 9.0 - 12.9 fL    Neutrophils-Polys 64.80 44.00  - 72.00 %    Lymphocytes 26.30 22.00 - 41.00 %    Monocytes 7.30 0.00 - 13.40 %    Eosinophils 0.60 0.00 - 6.90 %    Basophils 0.40 0.00 - 1.80 %    Immature Granulocytes 0.60 0.00 - 0.90 %    Nucleated RBC 0.00 /100 WBC    Neutrophils (Absolute) 6.17 2.00 - 7.15 K/uL    Lymphs (Absolute) 2.51 1.00 - 4.80 K/uL    Monos (Absolute) 0.70 0.00 - 0.85 K/uL    Eos (Absolute) 0.06 0.00 - 0.51 K/uL    Baso (Absolute) 0.04 0.00 - 0.12 K/uL    Immature Granulocytes (abs) 0.06 0.00 - 0.11 K/uL    NRBC (Absolute) 0.00 K/uL   COMP METABOLIC PANEL    Collection Time: 01/12/19  8:08 PM   Result Value Ref Range    Sodium 136 135 - 145 mmol/L    Potassium 5.3 3.6 - 5.5 mmol/L    Chloride 108 96 - 112 mmol/L    Co2 17 (L) 20 - 33 mmol/L    Anion Gap 11.0 0.0 - 11.9    Glucose 111 (H) 65 - 99 mg/dL    Bun 10 8 - 22 mg/dL    Creatinine 0.56 0.50 - 1.40 mg/dL    Calcium 9.3 8.5 - 10.5 mg/dL    AST(SGOT) 14 12 - 45 U/L    ALT(SGPT) 5 2 - 50 U/L    Alkaline Phosphatase 104 (H) 30 - 99 U/L    Total Bilirubin 0.3 0.1 - 1.5 mg/dL    Albumin 3.6 3.2 - 4.9 g/dL    Total Protein 6.9 6.0 - 8.2 g/dL    Globulin 3.3 1.9 - 3.5 g/dL    A-G Ratio 1.1 g/dL   URIC ACID    Collection Time: 01/12/19  8:08 PM   Result Value Ref Range    Uric Acid 4.8 1.9 - 8.2 mg/dL   HOLD BLOOD BANK SPECIMEN (NOT TESTED)    Collection Time: 01/12/19  8:08 PM   Result Value Ref Range    Holding Tube - Bb DONE    ESTIMATED GFR    Collection Time: 01/12/19  8:08 PM   Result Value Ref Range    GFR If African American >60 >60 mL/min/1.73 m 2    GFR If Non African American >60 >60 mL/min/1.73 m 2        Assessment:   Twyla Simpson at 36w6d  Labor status: Antepartum; gestational hypertension here for rule out Preeclampsia. Active herpetic out break .    Obstetrical history significant for   Patient Active Problem List    Diagnosis Date Noted   • Encounter for supervision of high risk pregnancy in third trimester, antepartum 01/02/2019   • Pregnancy induced hypertension,  antepartum 2018   • Rh negative state in antepartum period 2018   • Gestational diabetes 2018   • 23 weeks gestation of pregnancy 10/11/2018   • History of gestational diabetes mellitus (GDM) 10/11/2018   .      Plan:     Disposition: admit for 24 hour observation    gHTN vs PEC:   - 24 hour urine protein collection  - PEC labs daily  - BP measurements q 1 hour  - IV antihypertensives prn severe range Bps. Bps while in triage range 130 to rare 150 systolic and 70 to rare 90 diastolic.  - s/p steroid course on     HSV2 out break  - valtrex 1000mg BID x 7 days  - advised patient due to this, recommend  section if she has ROM, onset of labor, uncontrolled hypertension, or fetal distress. Advised that the lesions must be completely resolved to affect a vaginal delivery.  - Plan to resume 500mg daily prophylactic dose after acute treatment course.    Pregnancy  - daily NST  - cont PNV

## 2019-01-13 NOTE — PROGRESS NOTES
29y/o  edc 2/3/2019, EGA 36 6, Here to l&d room LDA 5 with self. C/O headache and increased blood pressure. EFM/TOCO applied, patients states positive fetal movement. Denies vaginal bleeding or leaking of fluid. IV started and labs sent. Dr Diallo updated on cervical exam and patient admitting to a current herpes outbreak, patient has not been taking prescribed medication. SVE 1/thick. Dr Diallo at patients bedside, orders to admit for overnight obs for 24 hour urine and BP check.   Meal provided, will check blood sugar one hour after. Orders for one hour NST daily received and fasting blood sugar in the morning

## 2019-01-13 NOTE — CARE PLAN
Problem: Infection  Goal: Will remain free from infection    Intervention: Implement standard precautions and perform hand washing before and after patient contact  Hand hygiene performed appropriately, education provided

## 2019-01-13 NOTE — PROGRESS NOTES
4380- Report received from THERESE Goodson RN. Pt ambulated to Antepartum Unit without incident. POC discussed. 24 hour urine collection started.    0636- fasting BS 96    0700- Report given to RODNEY Siegel RN. POC discussed.

## 2019-01-13 NOTE — PROGRESS NOTES
0700: report received from Fide HUMPHRIES. POC discussed, care assumed.   1020: Dr. Garcia updated regarding pt's BG   1900: report given to night shift RN

## 2019-01-14 LAB
ALT SERPL-CCNC: <5 U/L (ref 2–50)
AST SERPL-CCNC: 9 U/L (ref 12–45)
CREAT SERPL-MCNC: 0.58 MG/DL (ref 0.5–1.4)
CREAT UR-MCNC: 208.8 MG/DL
ERYTHROCYTE [DISTWIDTH] IN BLOOD BY AUTOMATED COUNT: 43.8 FL (ref 35.9–50)
GLUCOSE BLD-MCNC: 104 MG/DL (ref 65–99)
GLUCOSE BLD-MCNC: 78 MG/DL (ref 65–99)
GLUCOSE BLD-MCNC: 96 MG/DL (ref 65–99)
HCT VFR BLD AUTO: 40.9 % (ref 37–47)
HGB BLD-MCNC: 13.5 G/DL (ref 12–16)
MCH RBC QN AUTO: 30.5 PG (ref 27–33)
MCHC RBC AUTO-ENTMCNC: 33 G/DL (ref 33.6–35)
MCV RBC AUTO: 92.5 FL (ref 81.4–97.8)
PLATELET # BLD AUTO: 196 K/UL (ref 164–446)
PMV BLD AUTO: 10.9 FL (ref 9–12.9)
PROT 24H UR-MCNC: 226.8 MG/24 HR (ref 30–150)
PROT 24H UR-MRATE: 8.1 MG/DL (ref 0–15)
PROT UR-MCNC: 23 MG/DL (ref 0–15)
PROT/CREAT UR: 110 MG/G (ref 10–107)
RBC # BLD AUTO: 4.42 M/UL (ref 4.2–5.4)
SPECIMEN VOL UR: 2800 ML
WBC # BLD AUTO: 8.7 K/UL (ref 4.8–10.8)

## 2019-01-14 PROCEDURE — 82962 GLUCOSE BLOOD TEST: CPT | Mod: 91

## 2019-01-14 PROCEDURE — 59025 FETAL NON-STRESS TEST: CPT

## 2019-01-14 PROCEDURE — 36415 COLL VENOUS BLD VENIPUNCTURE: CPT

## 2019-01-14 PROCEDURE — A9270 NON-COVERED ITEM OR SERVICE: HCPCS | Performed by: OBSTETRICS & GYNECOLOGY

## 2019-01-14 PROCEDURE — 88307 TISSUE EXAM BY PATHOLOGIST: CPT

## 2019-01-14 PROCEDURE — 82570 ASSAY OF URINE CREATININE: CPT

## 2019-01-14 PROCEDURE — 82565 ASSAY OF CREATININE: CPT

## 2019-01-14 PROCEDURE — 84460 ALANINE AMINO (ALT) (SGPT): CPT

## 2019-01-14 PROCEDURE — 700111 HCHG RX REV CODE 636 W/ 250 OVERRIDE (IP): Performed by: ANESTHESIOLOGY

## 2019-01-14 PROCEDURE — 84156 ASSAY OF PROTEIN URINE: CPT

## 2019-01-14 PROCEDURE — 59510 CESAREAN DELIVERY: CPT | Performed by: OBSTETRICS & GYNECOLOGY

## 2019-01-14 PROCEDURE — 700102 HCHG RX REV CODE 250 W/ 637 OVERRIDE(OP): Performed by: OBSTETRICS & GYNECOLOGY

## 2019-01-14 PROCEDURE — 700102 HCHG RX REV CODE 250 W/ 637 OVERRIDE(OP): Performed by: ANESTHESIOLOGY

## 2019-01-14 PROCEDURE — 700105 HCHG RX REV CODE 258: Performed by: ANESTHESIOLOGY

## 2019-01-14 PROCEDURE — 305385 HCHG SURGICAL SERVICES 1/4 HOUR: Performed by: OBSTETRICS & GYNECOLOGY

## 2019-01-14 PROCEDURE — 59025 FETAL NON-STRESS TEST: CPT | Mod: 26 | Performed by: OBSTETRICS & GYNECOLOGY

## 2019-01-14 PROCEDURE — A9270 NON-COVERED ITEM OR SERVICE: HCPCS

## 2019-01-14 PROCEDURE — 85027 COMPLETE CBC AUTOMATED: CPT

## 2019-01-14 PROCEDURE — 304964 HCHG RECOVERY ROOM TIME 1HR: Performed by: OBSTETRICS & GYNECOLOGY

## 2019-01-14 PROCEDURE — 770002 HCHG ROOM/CARE - OB PRIVATE (112)

## 2019-01-14 PROCEDURE — 59514 CESAREAN DELIVERY ONLY: CPT | Mod: AS | Performed by: NURSE PRACTITIONER

## 2019-01-14 PROCEDURE — 306828 HCHG ANES-TIME GENERAL: Performed by: OBSTETRICS & GYNECOLOGY

## 2019-01-14 PROCEDURE — 700111 HCHG RX REV CODE 636 W/ 250 OVERRIDE (IP)

## 2019-01-14 PROCEDURE — 84450 TRANSFERASE (AST) (SGOT): CPT

## 2019-01-14 PROCEDURE — 700102 HCHG RX REV CODE 250 W/ 637 OVERRIDE(OP)

## 2019-01-14 RX ORDER — CITRIC ACID/SODIUM CITRATE 334-500MG
30 SOLUTION, ORAL ORAL ONCE
Status: COMPLETED | OUTPATIENT
Start: 2019-01-14 | End: 2019-01-14

## 2019-01-14 RX ORDER — HALOPERIDOL 5 MG/ML
1 INJECTION INTRAMUSCULAR
Status: CANCELLED | OUTPATIENT
Start: 2019-01-14

## 2019-01-14 RX ORDER — DOCUSATE SODIUM 100 MG/1
100 CAPSULE, LIQUID FILLED ORAL 2 TIMES DAILY PRN
Status: DISCONTINUED | OUTPATIENT
Start: 2019-01-14 | End: 2019-01-17 | Stop reason: HOSPADM

## 2019-01-14 RX ORDER — DIPHENHYDRAMINE HYDROCHLORIDE 50 MG/ML
12.5 INJECTION INTRAMUSCULAR; INTRAVENOUS EVERY 6 HOURS PRN
Status: ACTIVE | OUTPATIENT
Start: 2019-01-14 | End: 2019-01-15

## 2019-01-14 RX ORDER — SIMETHICONE 80 MG
80 TABLET,CHEWABLE ORAL 4 TIMES DAILY PRN
Status: DISCONTINUED | OUTPATIENT
Start: 2019-01-14 | End: 2019-01-17 | Stop reason: HOSPADM

## 2019-01-14 RX ORDER — ACETAMINOPHEN 500 MG
1000 TABLET ORAL EVERY 6 HOURS
Status: COMPLETED | OUTPATIENT
Start: 2019-01-15 | End: 2019-01-15

## 2019-01-14 RX ORDER — SODIUM CHLORIDE, SODIUM LACTATE, POTASSIUM CHLORIDE, CALCIUM CHLORIDE 600; 310; 30; 20 MG/100ML; MG/100ML; MG/100ML; MG/100ML
INJECTION, SOLUTION INTRAVENOUS CONTINUOUS
Status: CANCELLED | OUTPATIENT
Start: 2019-01-14

## 2019-01-14 RX ORDER — SODIUM CHLORIDE, SODIUM LACTATE, POTASSIUM CHLORIDE, CALCIUM CHLORIDE 600; 310; 30; 20 MG/100ML; MG/100ML; MG/100ML; MG/100ML
INJECTION, SOLUTION INTRAVENOUS PRN
Status: DISCONTINUED | OUTPATIENT
Start: 2019-01-14 | End: 2019-01-17 | Stop reason: HOSPADM

## 2019-01-14 RX ORDER — ONDANSETRON 2 MG/ML
4 INJECTION INTRAMUSCULAR; INTRAVENOUS EVERY 6 HOURS PRN
Status: ACTIVE | OUTPATIENT
Start: 2019-01-14 | End: 2019-01-15

## 2019-01-14 RX ORDER — OXYCODONE HYDROCHLORIDE 10 MG/1
10 TABLET ORAL EVERY 4 HOURS PRN
Status: DISPENSED | OUTPATIENT
Start: 2019-01-14 | End: 2019-01-15

## 2019-01-14 RX ORDER — VITAMIN A ACETATE, BETA CAROTENE, ASCORBIC ACID, CHOLECALCIFEROL, .ALPHA.-TOCOPHEROL ACETATE, DL-, THIAMINE MONONITRATE, RIBOFLAVIN, NIACINAMIDE, PYRIDOXINE HYDROCHLORIDE, FOLIC ACID, CYANOCOBALAMIN, CALCIUM CARBONATE, FERROUS FUMARATE, ZINC OXIDE, CUPRIC OXIDE 3080; 12; 120; 400; 1; 1.84; 3; 20; 22; 920; 25; 200; 27; 10; 2 [IU]/1; UG/1; MG/1; [IU]/1; MG/1; MG/1; MG/1; MG/1; MG/1; [IU]/1; MG/1; MG/1; MG/1; MG/1; MG/1
1 TABLET, FILM COATED ORAL EVERY MORNING
Status: DISCONTINUED | OUTPATIENT
Start: 2019-01-15 | End: 2019-01-17 | Stop reason: HOSPADM

## 2019-01-14 RX ORDER — SODIUM CHLORIDE, SODIUM LACTATE, POTASSIUM CHLORIDE, CALCIUM CHLORIDE 600; 310; 30; 20 MG/100ML; MG/100ML; MG/100ML; MG/100ML
INJECTION, SOLUTION INTRAVENOUS CONTINUOUS
Status: DISCONTINUED | OUTPATIENT
Start: 2019-01-14 | End: 2019-01-17 | Stop reason: HOSPADM

## 2019-01-14 RX ORDER — SODIUM CHLORIDE, SODIUM GLUCONATE, SODIUM ACETATE, POTASSIUM CHLORIDE AND MAGNESIUM CHLORIDE 526; 502; 368; 37; 30 MG/100ML; MG/100ML; MG/100ML; MG/100ML; MG/100ML
1500 INJECTION, SOLUTION INTRAVENOUS ONCE
Status: COMPLETED | OUTPATIENT
Start: 2019-01-14 | End: 2019-01-14

## 2019-01-14 RX ORDER — DIPHENHYDRAMINE HYDROCHLORIDE 50 MG/ML
25 INJECTION INTRAMUSCULAR; INTRAVENOUS EVERY 6 HOURS PRN
Status: ACTIVE | OUTPATIENT
Start: 2019-01-14 | End: 2019-01-15

## 2019-01-14 RX ORDER — DIPHENHYDRAMINE HYDROCHLORIDE 50 MG/ML
12.5 INJECTION INTRAMUSCULAR; INTRAVENOUS
Status: CANCELLED | OUTPATIENT
Start: 2019-01-14

## 2019-01-14 RX ORDER — HYDROMORPHONE HYDROCHLORIDE 1 MG/ML
0.4 INJECTION, SOLUTION INTRAMUSCULAR; INTRAVENOUS; SUBCUTANEOUS
Status: ACTIVE | OUTPATIENT
Start: 2019-01-14 | End: 2019-01-15

## 2019-01-14 RX ORDER — BISACODYL 10 MG
10 SUPPOSITORY, RECTAL RECTAL PRN
Status: DISCONTINUED | OUTPATIENT
Start: 2019-01-14 | End: 2019-01-17 | Stop reason: HOSPADM

## 2019-01-14 RX ORDER — METOCLOPRAMIDE HYDROCHLORIDE 5 MG/ML
10 INJECTION INTRAMUSCULAR; INTRAVENOUS ONCE
Status: COMPLETED | OUTPATIENT
Start: 2019-01-14 | End: 2019-01-14

## 2019-01-14 RX ADMIN — SODIUM CHLORIDE, SODIUM GLUCONATE, SODIUM ACETATE, POTASSIUM CHLORIDE AND MAGNESIUM CHLORIDE 1500 ML: 526; 502; 368; 37; 30 INJECTION, SOLUTION INTRAVENOUS at 20:42

## 2019-01-14 RX ADMIN — VALACYCLOVIR HYDROCHLORIDE 1000 MG: 500 TABLET, FILM COATED ORAL at 05:59

## 2019-01-14 RX ADMIN — SODIUM CITRATE AND CITRIC ACID MONOHYDRATE 30 ML: 500; 334 SOLUTION ORAL at 20:42

## 2019-01-14 RX ADMIN — FAMOTIDINE 20 MG: 10 INJECTION INTRAVENOUS at 20:42

## 2019-01-14 RX ADMIN — METFORMIN HYDROCHLORIDE 500 MG: 500 TABLET ORAL at 08:18

## 2019-01-14 RX ADMIN — METOCLOPRAMIDE 10 MG: 5 INJECTION, SOLUTION INTRAMUSCULAR; INTRAVENOUS at 20:42

## 2019-01-14 ASSESSMENT — PATIENT HEALTH QUESTIONNAIRE - PHQ9
SUM OF ALL RESPONSES TO PHQ9 QUESTIONS 1 AND 2: 0
1. LITTLE INTEREST OR PLEASURE IN DOING THINGS: NOT AT ALL
2. FEELING DOWN, DEPRESSED, IRRITABLE, OR HOPELESS: NOT AT ALL

## 2019-01-14 ASSESSMENT — PAIN SCALES - GENERAL
PAINLEVEL_OUTOF10: 0
PAINLEVEL_OUTOF10: 3
PAINLEVEL_OUTOF10: 5

## 2019-01-14 ASSESSMENT — LIFESTYLE VARIABLES
EVER_SMOKED: YES
ALCOHOL_USE: NO

## 2019-01-14 ASSESSMENT — PAIN SCALES - WONG BAKER: WONGBAKER_NUMERICALRESPONSE: DOESN'T HURT AT ALL

## 2019-01-14 NOTE — PROGRESS NOTES
Twyla Simpson   37w1d  Visit done with video interpretor # [id number]    Date of Admission: 2019  Patient Active Problem List    Diagnosis Date Noted   • Encounter for supervision of high risk pregnancy in third trimester, antepartum 2019   • Pregnancy induced hypertension, antepartum 2018   • Rh negative state in antepartum period 2018   • Gestational diabetes 2018   • 23 weeks gestation of pregnancy 10/11/2018   • History of gestational diabetes mellitus (GDM) 10/11/2018       Subjective:   uterine contractions:yes - reports they are increasing in strength and more regular than previously.  LOF: no  vaginal Bleeding: no  fetal movement +, maybe a little less this AM    Some mild HA on and off, no vision changes, no epigastric/RUQ pain    Objective:   Patient Vitals for the past 4 hrs:   BP Temp Temp src Pulse Resp   19 0826 134/80 36.2 °C (97.2 °F) Temporal (!) 105 17       Gen: AAO, NAD  Abdomen: gravid, soft, NT  Ext: NT, no edema, DTRs 2+    SVE: 2-3/40/-2; posterior    Twyla Simpson, a  at 37w1d with an LES of 2/3/2019, by Ultrasound, was seen at [facility] for a nonstress test.    Nonstress Test  Reason for NST: Labor Evaluation  Variability: Moderate  Decelerations: None  Accelerations: Yes  Acoustic Stimulator: No  Baseline: 140  Uterine Irritability: No  Contractions: Irregular  Minutes Between Contractions: 4 min (2-5)  Nonstress Test Interpretation  $ Nonstress Test Interpretation - Fetus A: Reactive  NST Interpreted By: FRAN Najera MD  Comments: as per my read: reactive      Meds:     Current Facility-Administered Medications:   •  calcium carbonate (TUMS) chewable tab 500 mg, 500 mg, Oral, Q HOUR PRN, Alvaro Yoel, D.O., 500 mg at 19 0200  •  mag hydrox-al hydrox-simeth (MAALOX PLUS ES or MYLANTA DS) suspension 10 mL, 10 mL, Oral, 4X/DAY PRN, Alvaro Yoel, D.O.  •  metFORMIN (GLUCOPHAGE) tablet 500 mg, 500 mg, Oral, QDAY with Breakfast, Alvaro Yoel, D.O.,  500 mg at 19 0818  •  glyBURIDE (DIABETA) tablet 7.5 mg, 7.5 mg, Oral, QHS, Alvaro Yoel, D.O., 7.5 mg at 195  •  metFORMIN (GLUCOPHAGE) tablet 1,000 mg, 1,000 mg, Oral, AFTER DINNER, Eloinazhou Yoel, D.O., 1,000 mg at 19 1836  •  valACYclovir (VALTREX) caplet 1,000 mg, 1,000 mg, Oral, BID, Eloinazhou Yoel, D.O., 1,000 mg at 19 0559  •  NS infusion, , Intravenous, Continuous, Alvaro Yoel, D.O.    Labs:    Results for CLAUS LAWRENCE (MRN [id number]) as of 2019 10:13   Ref. Range 2019 10:02 2019 13:16 2019 18:30 2019 06:02 2019 09:52   Glucose - Accu-Ck Latest Ref Range: 65 - 99 mg/dL 137 (H) 109 (H) 103 (H) 78 104 (H)       A/P:  28 y.o.  @ 37w1d with gHTN, A2GDM, genital HSV  - gHTN: Bps normal to mild range and pt asx; serum labs wnl and 24hr urine protein neg at 227mg.  Discussed w/ pt that with diagnosis gHTN recommendation is overall to deliver at 37wks, risk for worsening Bps and pre-eclampsia if continue pregnancy.  Pt with active herpes outbreak however with strong desire to avoid c/s if possible.  Discussed option to observe if her Bps remain normal as they currently are and other labs remain normal.  Plan previously had been to consider discharge with plan to return for IOL at 38wks; started marta some this AM more regularly.  Will evaluate for labor, in which case pt is understanding that she would need a c/s  - FHT: reactive as above  - GBS neg  - A2DM: metformin 500/1000, glyburide 7.5qHS.  BGs well controlled as above.  - HSV: on valtrex 1000mg BID    dispo: cont monitoring today, will repeat preE labs and keep on monitor now for possible labor; if no signs of labor today and Bps normal consider inpt vs outpt close monitoring for term preE and delivery asap with resolution of HSV lesion, earlier if indicated      Ileana Velasco MD  [facility], Women's Health      [id number] Addendum:  Pt with additional mild rang Bps.  I spoke  briefly with ID who also reports it is difficult to know when her viral shedding from the recurrent outbreak would pass.  Recommend delivery now given Bps, pt amenable.  Pt reports she doesn't think she still has the lesion, however had it over the weekend - she noticed on Saturday, better yesterday - so I recommend we do a primary c/s given proximity of HSV outbreak in accordance with brief discussion with ID.  Pt ate lunch around 1300, will plan for c/s around 2100 after 8hrs NPO unless indicated prior.    Ileana Velasco MD  [facility], Women's Health

## 2019-01-14 NOTE — PROGRESS NOTES
Twyla Simpson   37w0d  Admission DX:  Hypertension affecting pregnancy  HSV 2     Date of Admission: 1/12/2019  Patient Active Problem List    Diagnosis Date Noted   • Encounter for supervision of high risk pregnancy in third trimester, antepartum 01/02/2019   • Pregnancy induced hypertension, antepartum 12/27/2018   • Rh negative state in antepartum period 11/26/2018   • Gestational diabetes 11/21/2018   • 23 weeks gestation of pregnancy 10/11/2018   • History of gestational diabetes mellitus (GDM) 10/11/2018       Subjective:   uterine contractions:no  pain: .no  LOF: no  vaginal Bleeding: no  fetal movement: normal  Pt doing well, comfortable, no headaches, no abd pain, no visual changes.    Objective:   Vitals:    01/13/19 1147 01/13/19 1621 01/13/19 1700 01/13/19 1917   BP: 134/84 128/72  136/81   Pulse: (!) 105 (!) 114  96   Resp:    16   Temp: 37.1 °C (98.8 °F)  36.7 °C (98 °F) 36.3 °C (97.4 °F)   TempSrc:   Temporal Temporal   Weight:       Height:         FHR: 135, pos accels, neg decels, moderate variability, cat 1 FHR  Gen: NAD, comfortable  Membranes: ruptured: no  Abdomen: gravid, soft, NT  Ext: no edema, nontender, no cyanosis or clubbing    Meds:     Current Facility-Administered Medications:   •  calcium carbonate (TUMS) chewable tab 500 mg, 500 mg, Oral, Q HOUR PRN, [facility] Yoel, D.O., 500 mg at 01/13/19 0200  •  mag hydrox-al hydrox-simeth (MAALOX PLUS ES or MYLANTA DS) suspension 10 mL, 10 mL, Oral, 4X/DAY PRN, [facility] Yoel, D.O.  •  metFORMIN (GLUCOPHAGE) tablet 500 mg, 500 mg, Oral, QDAY with Breakfast, love Yoel, D.O., 500 mg at 01/13/19 0814  •  glyBURIDE (DIABETA) tablet 7.5 mg, 7.5 mg, Oral, QHS, love Yoel, D.O., 7.5 mg at 01/13/19 2125  •  metFORMIN (GLUCOPHAGE) tablet 1,000 mg, 1,000 mg, Oral, AFTER DINNER, Alvaro Diallo D.O., 1,000 mg at 01/13/19 1836  •  valACYclovir (VALTREX) caplet 1,000 mg, 1,000 mg, Oral, BID, Alvaro Diallo D.O., 1,000 mg at 01/13/19 1829  •  NS infusion, ,  Intravenous, Continuous, Alvaro iDallo D.O.    Labs:    Lab:   Recent Results (from the past 72 hour(s))   POC UA    Collection Time: 01/12/19  8:01 PM   Result Value Ref Range    POC Color Yellow     POC Appearance Clear     POC Glucose Negative Negative mg/dL    POC Ketones 15 (A) Negative mg/dL    POC Specific Gravity 1.025 1.005 - 1.030    POC Blood Negative Negative    POC Urine PH 5.5 5.0 - 8.0    POC Protein Negative Negative mg/dL    POC Nitrites Negative Negative    POC Leukocyte Esterase Negative Negative   CBC WITH DIFFERENTIAL    Collection Time: 01/12/19  8:08 PM   Result Value Ref Range    WBC 9.5 4.8 - 10.8 K/uL    RBC 4.51 4.20 - 5.40 M/uL    Hemoglobin 13.2 12.0 - 16.0 g/dL    Hematocrit 41.7 37.0 - 47.0 %    MCV 92.5 81.4 - 97.8 fL    MCH 29.3 27.0 - 33.0 pg    MCHC 31.7 (L) 33.6 - 35.0 g/dL    RDW 43.7 35.9 - 50.0 fL    Platelet Count 201 164 - 446 K/uL    MPV 10.7 9.0 - 12.9 fL    Neutrophils-Polys 64.80 44.00 - 72.00 %    Lymphocytes 26.30 22.00 - 41.00 %    Monocytes 7.30 0.00 - 13.40 %    Eosinophils 0.60 0.00 - 6.90 %    Basophils 0.40 0.00 - 1.80 %    Immature Granulocytes 0.60 0.00 - 0.90 %    Nucleated RBC 0.00 /100 WBC    Neutrophils (Absolute) 6.17 2.00 - 7.15 K/uL    Lymphs (Absolute) 2.51 1.00 - 4.80 K/uL    Monos (Absolute) 0.70 0.00 - 0.85 K/uL    Eos (Absolute) 0.06 0.00 - 0.51 K/uL    Baso (Absolute) 0.04 0.00 - 0.12 K/uL    Immature Granulocytes (abs) 0.06 0.00 - 0.11 K/uL    NRBC (Absolute) 0.00 K/uL   COMP METABOLIC PANEL    Collection Time: 01/12/19  8:08 PM   Result Value Ref Range    Sodium 136 135 - 145 mmol/L    Potassium 5.3 3.6 - 5.5 mmol/L    Chloride 108 96 - 112 mmol/L    Co2 17 (L) 20 - 33 mmol/L    Anion Gap 11.0 0.0 - 11.9    Glucose 111 (H) 65 - 99 mg/dL    Bun 10 8 - 22 mg/dL    Creatinine 0.56 0.50 - 1.40 mg/dL    Calcium 9.3 8.5 - 10.5 mg/dL    AST(SGOT) 14 12 - 45 U/L    ALT(SGPT) 5 2 - 50 U/L    Alkaline Phosphatase 104 (H) 30 - 99 U/L    Total Bilirubin 0.3  0.1 - 1.5 mg/dL    Albumin 3.6 3.2 - 4.9 g/dL    Total Protein 6.9 6.0 - 8.2 g/dL    Globulin 3.3 1.9 - 3.5 g/dL    A-G Ratio 1.1 g/dL   URIC ACID    Collection Time: 01/12/19  8:08 PM   Result Value Ref Range    Uric Acid 4.8 1.9 - 8.2 mg/dL   HOLD BLOOD BANK SPECIMEN (NOT TESTED)    Collection Time: 01/12/19  8:08 PM   Result Value Ref Range    Holding Tube - Bb DONE    ESTIMATED GFR    Collection Time: 01/12/19  8:08 PM   Result Value Ref Range    GFR If African American >60 >60 mL/min/1.73 m 2    GFR If Non African American >60 >60 mL/min/1.73 m 2   ACCU-CHEK GLUCOSE    Collection Time: 01/13/19  1:56 AM   Result Value Ref Range    Glucose - Accu-Ck 106 (H) 65 - 99 mg/dL   CBC WITHOUT DIFFERENTIAL    Collection Time: 01/13/19  5:15 AM   Result Value Ref Range    WBC 9.4 4.8 - 10.8 K/uL    RBC 3.95 (L) 4.20 - 5.40 M/uL    Hemoglobin 12.1 12.0 - 16.0 g/dL    Hematocrit 36.8 (L) 37.0 - 47.0 %    MCV 93.2 81.4 - 97.8 fL    MCH 30.6 27.0 - 33.0 pg    MCHC 32.9 (L) 33.6 - 35.0 g/dL    RDW 43.7 35.9 - 50.0 fL    Platelet Count 183 164 - 446 K/uL    MPV 10.9 9.0 - 12.9 fL   URIC ACID    Collection Time: 01/13/19  5:15 AM   Result Value Ref Range    Uric Acid 5.5 1.9 - 8.2 mg/dL   COMP METABOLIC PANEL    Collection Time: 01/13/19  5:15 AM   Result Value Ref Range    Co2 20 20 - 33 mmol/L    Glucose 103 (H) 65 - 99 mg/dL    Bun 8 8 - 22 mg/dL    Creatinine 0.55 0.50 - 1.40 mg/dL    Calcium 8.7 8.5 - 10.5 mg/dL    AST(SGOT) 7 (L) 12 - 45 U/L    ALT(SGPT) <5 2 - 50 U/L    Alkaline Phosphatase 100 (H) 30 - 99 U/L    Total Bilirubin 0.3 0.1 - 1.5 mg/dL    Albumin 3.1 (L) 3.2 - 4.9 g/dL    Total Protein 5.6 (L) 6.0 - 8.2 g/dL    Globulin 2.5 1.9 - 3.5 g/dL    A-G Ratio 1.2 g/dL    Sodium 136 135 - 145 mmol/L    Potassium 3.6 3.6 - 5.5 mmol/L    Chloride 107 96 - 112 mmol/L    Anion Gap 9.0 0.0 - 11.9   ESTIMATED GFR    Collection Time: 01/13/19  5:15 AM   Result Value Ref Range    GFR If African American >60 >60 mL/min/1.73 m  2    GFR If Non African American >60 >60 mL/min/1.73 m 2   ACCU-CHEK GLUCOSE    Collection Time: 19  6:36 AM   Result Value Ref Range    Glucose - Accu-Ck 96 65 - 99 mg/dL   ACCU-CHEK GLUCOSE    Collection Time: 19 10:02 AM   Result Value Ref Range    Glucose - Accu-Ck 137 (H) 65 - 99 mg/dL   ACCU-CHEK GLUCOSE    Collection Time: 19  1:16 PM   Result Value Ref Range    Glucose - Accu-Ck 109 (H) 65 - 99 mg/dL   ACCU-CHEK GLUCOSE    Collection Time: 19  6:30 PM   Result Value Ref Range    Glucose - Accu-Ck 103 (H) 65 - 99 mg/dL       Assessment:  28 y.o.  @ 37w0d  Gestational HTN r/o preeclampsia  HSV 2 outbreak  A2 GDM  GBS neg    Plan:  Continue antepartum care  NSTs Q shift  Valtrex 1000mg BID  Metformin 500mg/1000mg, Glyburide 7.5mg QHS  24 hour urine collection pending  If 24 hour urine normal and no evidence of preeclampsia, will d/c home      Evaluation and clinical decision making including analysis of fetal data, imaging and maternal lab work completed over a 30 minute period.

## 2019-01-14 NOTE — PROGRESS NOTES
Report received and plan fo care discussed.  Pt states she is feeling well, 24 hour urine in progress.  States baby is moving, states she feels some occasional mild UC's.  Denies vaginal bleeding or leaking.  No other complaints @ this time.  2200--Pt states she is feeling some uterine activity--uterine irritability noted on monitor.  Abdomen palpates softly.  Pt encouraged to drink fluids and keep bladder empty.  2320--24 hour urine sent to lab @ this time.  0000--Pt resting comfortably without complaints @ this time.  0600--Fasting FSBS = 78mg/dL.  Pt says she has felt the baby moving and the UC's went away.    0700--Report to oncoming RN and plan of care discussed.

## 2019-01-14 NOTE — CARE PLAN
Problem: Infection  Goal: Will remain free from infection  Outcome: PROGRESSING AS EXPECTED  Patient remains afebrile. No S&S of infections     Problem: Venous Thromboembolism (VTW)/Deep Vein Thrombosis (DVT) Prevention:  Goal: Patient will participate in Venous Thrombosis (VTE)/Deep Vein Thrombosis (DVT)Prevention Measures  Outcome: PROGRESSING AS EXPECTED  Patient ambulates in room

## 2019-01-14 NOTE — CARE PLAN
Problem: Infection  Goal: Will remain free from infection  Outcome: PROGRESSING AS EXPECTED  Pt afebrile, and no s/s of infection noted at this time.

## 2019-01-14 NOTE — PROGRESS NOTES
0700- Report received from CARRIE Gresham RN.POC discussed, pt verbalized understanding. Patient is a  edc  making her 37.1 weeks.    0730-  Patient tearful in room. States she is having abdomen pain. denies any leaking of any fluid or any vaginal bleeding. States positive fetal movement. Patient denies any HA or vision changes. All questions answered, no further needs at this time. Assessment complete.     0930- Dr Najera into talk with patient with  Ipad. Discussed plan of care. Will continue to monitor blood pressures and labor status. Discussed that if need to deliver it will be a  section for active herpes lesion. Patient agrees with plan of care.     0955- fingerstick after breakfast 104    1359- finger stick 96.    1500- Dr Najera request blood pressures every 15 minutes to see if any are elevated. Will call infectious disease regarding when it is safe to deliver vaginally with herpes lesion.     1530- discussed with patient plan of care. Patient will be NPO at this time.     1645- patient will be scheduled for c/s at 2100. Patient agrees with plan of care.     190- report given to CARRIE Gresham RN

## 2019-01-15 LAB
ERYTHROCYTE [DISTWIDTH] IN BLOOD BY AUTOMATED COUNT: 42.5 FL (ref 35.9–50)
GLUCOSE BLD-MCNC: 110 MG/DL (ref 65–99)
GLUCOSE BLD-MCNC: 140 MG/DL (ref 65–99)
HCT VFR BLD AUTO: 33.9 % (ref 37–47)
HGB BLD-MCNC: 11.3 G/DL (ref 12–16)
MCH RBC QN AUTO: 30.5 PG (ref 27–33)
MCHC RBC AUTO-ENTMCNC: 33.3 G/DL (ref 33.6–35)
MCV RBC AUTO: 91.6 FL (ref 81.4–97.8)
NUMBER OF RH DOSES IND 8505RD: NORMAL
PATHOLOGY CONSULT NOTE: NORMAL
PLATELET # BLD AUTO: 168 K/UL (ref 164–446)
PMV BLD AUTO: 10.7 FL (ref 9–12.9)
RBC # BLD AUTO: 3.7 M/UL (ref 4.2–5.4)
WBC # BLD AUTO: 11.7 K/UL (ref 4.8–10.8)

## 2019-01-15 PROCEDURE — 700102 HCHG RX REV CODE 250 W/ 637 OVERRIDE(OP): Performed by: OBSTETRICS & GYNECOLOGY

## 2019-01-15 PROCEDURE — 85027 COMPLETE CBC AUTOMATED: CPT

## 2019-01-15 PROCEDURE — A9270 NON-COVERED ITEM OR SERVICE: HCPCS | Performed by: ANESTHESIOLOGY

## 2019-01-15 PROCEDURE — 59514 CESAREAN DELIVERY ONLY: CPT

## 2019-01-15 PROCEDURE — A9270 NON-COVERED ITEM OR SERVICE: HCPCS | Performed by: OBSTETRICS & GYNECOLOGY

## 2019-01-15 PROCEDURE — 36415 COLL VENOUS BLD VENIPUNCTURE: CPT

## 2019-01-15 PROCEDURE — 700102 HCHG RX REV CODE 250 W/ 637 OVERRIDE(OP): Performed by: ANESTHESIOLOGY

## 2019-01-15 PROCEDURE — 82962 GLUCOSE BLOOD TEST: CPT

## 2019-01-15 PROCEDURE — 770002 HCHG ROOM/CARE - OB PRIVATE (112)

## 2019-01-15 RX ORDER — MISOPROSTOL 200 UG/1
200 TABLET ORAL ONCE
Status: ACTIVE | OUTPATIENT
Start: 2019-01-15 | End: 2019-01-16

## 2019-01-15 RX ADMIN — ACETAMINOPHEN 1000 MG: 500 TABLET ORAL at 00:29

## 2019-01-15 RX ADMIN — METFORMIN HYDROCHLORIDE 500 MG: 500 TABLET ORAL at 18:24

## 2019-01-15 RX ADMIN — OXYCODONE HYDROCHLORIDE 10 MG: 10 TABLET ORAL at 21:45

## 2019-01-15 RX ADMIN — ACETAMINOPHEN 1000 MG: 500 TABLET ORAL at 06:09

## 2019-01-15 RX ADMIN — ACETAMINOPHEN 1000 MG: 500 TABLET ORAL at 18:24

## 2019-01-15 RX ADMIN — VALACYCLOVIR HYDROCHLORIDE 1000 MG: 500 TABLET, FILM COATED ORAL at 18:24

## 2019-01-15 RX ADMIN — ACETAMINOPHEN 1000 MG: 500 TABLET ORAL at 12:01

## 2019-01-15 RX ADMIN — OXYCODONE HYDROCHLORIDE 10 MG: 10 TABLET ORAL at 09:23

## 2019-01-15 RX ADMIN — METFORMIN HYDROCHLORIDE 500 MG: 500 TABLET ORAL at 06:09

## 2019-01-15 RX ADMIN — Medication 1 TABLET: at 06:09

## 2019-01-15 RX ADMIN — VALACYCLOVIR HYDROCHLORIDE 1000 MG: 500 TABLET, FILM COATED ORAL at 10:55

## 2019-01-15 ASSESSMENT — PATIENT HEALTH QUESTIONNAIRE - PHQ9
2. FEELING DOWN, DEPRESSED, IRRITABLE, OR HOPELESS: NOT AT ALL
SUM OF ALL RESPONSES TO PHQ9 QUESTIONS 1 AND 2: 0
1. LITTLE INTEREST OR PLEASURE IN DOING THINGS: NOT AT ALL

## 2019-01-15 ASSESSMENT — PAIN SCALES - GENERAL
PAINLEVEL_OUTOF10: 6
PAINLEVEL_OUTOF10: 5
PAINLEVEL_OUTOF10: 0
PAINLEVEL_OUTOF10: 7
PAINLEVEL_OUTOF10: 3
PAINLEVEL_OUTOF10: 0

## 2019-01-15 NOTE — PROGRESS NOTES
Patient received from labor and delivery to S316. Bedside report received from Fide HUMPHRIES L&D , assumed care of patient.  iPad  used for orientation to room. Patient oriented to room and surroundings, call system, skylight education channel, visiting policy, infant security including ID bands, not letting anyone take infant without photo ID, 0-10 pain scale and pain management discuss.  Patient instructed to call for assistance as needed. Assessment done. Incision covered with mepilex dressing, CDI, lochia light and fundus firm.

## 2019-01-15 NOTE — OR SURGEON
Immediate Post OP Note    PreOp Diagnosis:   1. 29yo  @ 37w1d by 17w5d US  2.  Gestational hypertension  3.  GDMA2 - Metformin 500/1000, glyburide 7.5mg qhs  4.  Genital lesions - HSV2  5.  Rh negative    PostOp Diagnosis: same s/p primary LTCS    Procedure(s):  PRIMARY C SECTION    Surgeon(s):  Ofe Lund D.O.    Anesthesiologist/Type of Anesthesia:  Anesthesiologist: Link Skinner M.D./Spinal    Surgical Staff:  Circulator: Fide Gallegos RYURIY  Scrub Person: Lyn Theodore  First Assist: Demetrice Navarro C.NATILIO  Count [place]: Radha Heck R.N.  L&D Baby  Nurse: Nikki Yin R.N.    Specimens removed if any:  Placenta    Estimated Blood Loss: 700     Findings: No suprafascial adhesive disease nor within the pelvis. Upon hysterotomy, moderate amount of clear fluid and an male infant delivered from vertex position at 2140 with Apgars of 8/9 and weight of 3730g. Placenta delivered intact with use of manual traction and fundal massage; it was normal appearing with intact with a 3 vessel cord. Normal uterus, bilateral fallopian tubes and ovaries.      Complications: None        2019 7:25 PM Ofe Lund D.O.

## 2019-01-15 NOTE — LACTATION NOTE
This note was copied from a baby's chart.  Baby 37.1 weeks, Hx active Herpes HSV & GDM, Polish speaking- ipad  used [id number]. Discussed with mother if she does not have any herpes lesions on breasts or nipples she may breastfeed, discussed importance of washing hands frequently. [facility] information on Herpes provided, mother states she can read english better than speaking english. Mother has baby independently at breast swaddled with shallow latch. Asked mother if I could help with positioning baby for deeper latch, mother agreed. Assisted baby to left breast using football hold, skin to skin, obtained deep latch with coordinated suck, mother denies pain with latch. NB booklet given with review, informed mother on outpatient lactation support through [facility], 1:1 consults & breastfeeding Crow. Encouraged mother to call for any lactation needs.    Teaching on hunger cues, breastfeeding when baby shows cues or by 3 hours from last feed, importance of skin to skin, positioning baby at breast for deep latch, cluster feeding & hand expression.     Breastfeeding POC:  Breastfeed on demand or by 3 hours from last feed. F/U with [facility] for outpatient lactation support.

## 2019-01-15 NOTE — PROGRESS NOTES
2000- Report received from RODNEY Gresham RN. Dr. Lund and myself at bedside.  IPOD in use, #[id number]. Pt prepped for C/S.    2140- C/S delivery of viable male infant. Apgars 8/9    2326- Pt and infant transferred to [facility]PU via [facility], accompanied by Donald (FOB), without incident. Report given to THERESE Dillard RN. POC discussed. Bands and cuddles verified.    0010- Placenta to lab via [facility], confirmed with Ej, by phone, in lab.

## 2019-01-15 NOTE — PROGRESS NOTES
[facility] POSTPARTUM PROGRESS NOTE    PATIENT ID:  NAME:  Twyla Simpson  MRN:               [id number]  YOB: 1990     28 y.o. female admitted  now at 37w1d POD#1 s/p C section, low transverse via Pfannenstiel skin incision with double layer uterine closure (19 @ 21:04).    Subjective:   Abdominal pain: Mild  Ambulating: No  tolerating liquids: No  tolerating regular diet: No   Flatus: No  BM: No  Bleeding: Mild  Voiding: Yes, rushing in place  Dizziness: None  Breast feeding: Yes  Breast tenderness: No    Objective:    Vitals:    01/15/19 0000 01/15/19 0200 01/15/19 0400 01/15/19 0600   BP:   142/92    Pulse: (!) 101 97 (!) 102 100   Resp: 18 17 17 17   Temp:   36.3 °C (97.3 °F)    TempSrc:   Temporal    SpO2: 95% 96% 92% 94%   Weight:       Height:         General: No acute distress, resting comfortably in bed.  HEENT: normocephalic, nontraumatic, PERRLA, EOMI  Cardiovascular: Heart RRR with no murmurs, rubs or gallops. Distal Pulses 2+  Respiratory: symmetric chest expansion, lungs CTA bilaterally with no wheezes rales or rhonci  Abdomen: soft, mildly tender around incision which is clean- tegaderm in place, dry and intact, fundus firm and below the umbilicus, +BS- but sluggish throughout  Genitourinary: lochia light, denies excessive vaginal bleeding  Musculoskeletal: strength 5/5 in four extremities  Neuro: non focal with no numbness, tingling or changes in sensation      Recent Labs      19   0515  19   1137   WBC  9.5  9.4  8.7   RBC  4.51  3.95*  4.42   HEMOGLOBIN  13.2  12.1  13.5   HEMATOCRIT  41.7  36.8*  40.9   MCV  92.5  93.2  92.5   MCH  29.3  30.6  30.5   RDW  43.7  43.7  43.8   PLATELETCT  201  183  196   MPV  10.7  10.9  10.9   NEUTSPOLYS  64.80   --    --    LYMPHOCYTES  26.30   --    --    MONOCYTES  7.30   --    --    EOSINOPHILS  0.60   --    --    BASOPHILS  0.40   --    --      Recent Labs      19   0515   SODIUM  136  136    POTASSIUM  5.3  3.6   CHLORIDE  108  107   CO2  17*  20   GLUCOSE  111*  103*   BUN  10  8       Current Meds:   Current Facility-Administered Medications   Medication Dose Frequency Provider Last Rate Last Dose   • miSOPROStol (CYTOTEC) tablet 200 mcg  200 mcg Once Ofe Lund, D.O.       • LR infusion   PRN Ofe Lund D.O.       • docusate sodium (COLACE) capsule 100 mg  100 mg BID PRN Ofe Lund, D.O.       • bisacodyl (DULCOLAX) suppository 10 mg  10 mg PRN Ofe Lund, D.O.       • simethicone (MYLICON) chewable tab 80 mg  80 mg 4X/DAY PRN Ofe Lund, D.O.       • prenatal plus vitamin (STUARTNATAL 1+1) 27-1 MG tablet 1 Tab  1 Tab QAM Ofe Lund D.O.   1 Tab at 01/15/19 0609   • oxytocin (PITOCIN) 20 UNITS/1000ML LR           • lactated ringers (LR) infusion   Continuous Ofe Lund, D.O.       • acetaminophen (TYLENOL) tablet 1,000 mg  1,000 mg Q6HR Link Skinner M.D.   1,000 mg at 01/15/19 0609   • oxyCODONE immediate release (ROXICODONE) tablet 10 mg  10 mg Q4HRS PRN Link Skinner M.D.       • HYDROmorphone pf (DILAUDID) injection 0.4 mg  0.4 mg Q2HRS PRN Link Skinner M.D.       • ondansetron (ZOFRAN) syringe/vial injection 4 mg  4 mg Q6HRS PRN Link Skinner M.D.       • diphenhydrAMINE (BENADRYL) injection 12.5 mg  12.5 mg Q6HRS PRN Link Skinner M.D.       • diphenhydrAMINE (BENADRYL) injection 12.5 mg  12.5 mg Q6HRS PRN Link Skinner M.D.        Or   • diphenhydrAMINE (BENADRYL) injection 25 mg  25 mg Q6HRS PRN Link Skinner M.D.        Or   • naloxone (NARCAN) 0.4 mg in NS 1,000 mL infusion  0.4 mg PRN Link Skinner M.D.       • metFORMIN (GLUCOPHAGE) tablet 500 mg  500 mg AFTER DINNER Ofe Lund D.O.       • calcium carbonate (TUMS) chewable tab 500 mg  500 mg Q HOUR PRN Alvaro Diallo D.OBarbra   500 mg at 01/13/19 0200   • mag hydrox-al hydrox-simeth (MAALOX PLUS ES or MYLANTA DS) suspension 10 mL  10 mL  4X/DAY PRN Alvaro Diallo D.O.       • metFORMIN (GLUCOPHAGE) tablet 500 mg  500 mg QDAY with Breakfast ANN-MARIE Mae.O.   500 mg at 01/15/19 0609   • valACYclovir (VALTREX) caplet 1,000 mg  1,000 mg BID Alvaro Diallo D.O.   Stopped at 19 1800   • NS infusion   Continuous Alvaro Diallo D.O.         Last reviewed on 2019  6:38 AM by Fide Gallegos, R.N.        Assessment:  28 y.o. female  at 37w1d POD#1 s/p C section, low transverse via Pfannenstiel skin incision with double layer uterine closure (19 @ 21:07).    Plan:   1. Encourage ambulation  2. To dc rushing  3. Advance diet as tolerated per return of bowel function  4. Pain control PRN  5. Nausea/vomiting treated with Zofran PRN  6. Resume Valtrex    Anticipate D/C home on POD#3    Demetrice Guerrier M.D.

## 2019-01-15 NOTE — OP REPORT
DATE OF SERVICE:  19     PREOPERATIVE DIAGNOSES:  1.  Intrauterine pregnancy at 37w1d  2.  gHTN  3.  GDMA2  4.  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  Rh negative     POSTOPERATIVE DIAGNOSES:  1.  s/p  section at 37w1d for HSV2, gHTN  2.  GDMA2  3.  Rh negative    PROCEDURE PERFORMED:  Low Transverse  section via Pfannenstiel skin incision with double layer uterine closure     SURGEON:  Ofe Lund DO     ASSISTANT: Demetrice BRENARDO     ANESTHESIA:  Spinal     ANESTHESIOLOGIST:  Dr. Skinner     SPECIMEN:  Placenta     ESTIMATED BLOOD LOSS:  700 mL    FINDINGS: No suprafascial adhesive disease nor within the pelvis. Upon hysterotomy, moderate amount of clear fluid and an male infant delivered from vertex position at 2140 with Apgars of 8/9 and weight of 3730g. Placenta delivered intact with use of manual traction and fundal massage; it was normal appearing with intact with a 3 vessel cord. Normal uterus, bilateral fallopian tubes and ovaries.     COMPLICATIONS:  None     PROCEDURE:  The patient was identified and appropriate consents were obtained and verified.  She was then taken to the operating room where spinal anesthesia was placed. See a separate note from anesthesia for further details. She was then placed in supine position with left lateral tilt and a rushing catheter was inserted to drain the bladder. Sequential compression devices were placed on the bilateral lower extremities and confirmed to be in working order. She was prepped and draped in the usual sterile fashion. A surgical time-out was performed. After establishing that the anesthesia was adequate, a 10cm Pfannenstiel skin incision was made with a scalpel. The incision was carried down to the level of the fascia.  The fascia was incised transversely in the midline and extended bilaterally. The underlying rectus muscle was  from the fascia superiorly and inferiorly. The rectus muscles were  bluntly in the  midline and the peritoneum identified. The peritoneal cavity was entered using blunt dissection. The bladder blade was placed and bladder was far from sight of planned hysterotomy. The lower uterine segment was identified and uterus was incised in a transverse fashion with a scalpel.  This incision was then extended bluntly.  Amniotomy was performed and there was noted to be clear amniotic fluid. The surgeon's hand was used to elevate the fetal vertex to the level of the hysterotomy.  Fundal pressure was applied and the fetus was delivered without difficulty. Delayed cord clamping was performed and then the cord was clamped twice and cut.  The infant was handed off to waiting attendant. A cord segment was taken for assessment of fetal blood gasses if necessary (not done).  The placenta was delivered intact using fundal massage and manual traction on the remaining cord.  The uterus was exteriorized and cleared of clot and debris.  A Pitocin bolus was given per L&D protocol.  The hysterotomy was closed with a running locked suture of 0-vicryl and a second imbricating layer was placed with 0-vicryl.  The uterine incision was found to be hemostatic. The adnexa were examined with the findings as above. The posterior cul-de-sac was cleared. The uterus was returned to the abdominal cavity and each of the paracolic gutters was swept in a similar fashion with a wet laparotomy sponge.The hysterotomy was examined once more and again found to be hemostatic except for one serosal edge near right apex which was coagulated with bovie to achieve hemostasis. The underside of the rectus fascia both superiorly and inferiorly was inspected and hemostatic. The fascia was then closed with a running suture of O-vicryl. The fascial closure was palpated and found to be intact. The subcutaneous tissue was then irrigated and examined for hemostasis. After hemostasis had been achieved, the subcutaneous tissue was then closed with 2.0 catgut to  minimize risk of seroma. The skin was then sutured with 4.0 vicryl in a running, subcuticular fashion. Sterile bandage was applied. Sponge and instrument counts were correct x 2.  Patient tolerated the procedure well and went to recovery room in stable condition.    DO Abdifatah Mc[facility]on[facility], Women's Health

## 2019-01-15 NOTE — PROGRESS NOTES
Counseling for primary  section  ID: 29yo  @ 37w1d with gHTN, GDMA2, genital HSV2 and Rh negative    Indication: gHTN with genital HSV outbreak    Discussed with the patient the benefits, risks and alternative of  delivery. Risks include but are not limited to pain, infection, bleeding with possible need for transfusion, scarring, damage to surrounding structures.  Specifically organs that can be damaged are bowel, bladder, ureters, and nerves. I also discussed with the patient the risk of wound infection and wound breakdown. We discussed that these risks are greater in people that have diabetes or obesity. I also discussed the risk of emergency blood transfusion, rare risk of emergency hysterectomy or death.  Patient had the opportunity to ask questions. All questions were answered to the patient's satisfaction.  She reported understanding and signed consent.    This consent was performed with use of video  #[id number]    DO Abdifatah MckeonDepartment [facility], Women's Health

## 2019-01-16 LAB
GLUCOSE BLD-MCNC: 108 MG/DL (ref 65–99)
GLUCOSE BLD-MCNC: 127 MG/DL (ref 65–99)
GLUCOSE BLD-MCNC: 127 MG/DL (ref 65–99)
GLUCOSE BLD-MCNC: 128 MG/DL (ref 65–99)

## 2019-01-16 PROCEDURE — 700102 HCHG RX REV CODE 250 W/ 637 OVERRIDE(OP): Performed by: OBSTETRICS & GYNECOLOGY

## 2019-01-16 PROCEDURE — 770002 HCHG ROOM/CARE - OB PRIVATE (112)

## 2019-01-16 PROCEDURE — 700112 HCHG RX REV CODE 229: Performed by: OBSTETRICS & GYNECOLOGY

## 2019-01-16 PROCEDURE — A9270 NON-COVERED ITEM OR SERVICE: HCPCS | Performed by: OBSTETRICS & GYNECOLOGY

## 2019-01-16 PROCEDURE — 82962 GLUCOSE BLOOD TEST: CPT | Mod: 91

## 2019-01-16 RX ORDER — HYDROMORPHONE HYDROCHLORIDE 1 MG/ML
0.4 INJECTION, SOLUTION INTRAMUSCULAR; INTRAVENOUS; SUBCUTANEOUS
Status: DISCONTINUED | OUTPATIENT
Start: 2019-01-16 | End: 2019-01-16

## 2019-01-16 RX ORDER — DIPHENHYDRAMINE HYDROCHLORIDE 50 MG/ML
25 INJECTION INTRAMUSCULAR; INTRAVENOUS EVERY 6 HOURS PRN
Status: DISCONTINUED | OUTPATIENT
Start: 2019-01-16 | End: 2019-01-17 | Stop reason: HOSPADM

## 2019-01-16 RX ORDER — ONDANSETRON 4 MG/1
4 TABLET, ORALLY DISINTEGRATING ORAL EVERY 6 HOURS PRN
Status: DISCONTINUED | OUTPATIENT
Start: 2019-01-16 | End: 2019-01-17 | Stop reason: HOSPADM

## 2019-01-16 RX ORDER — OXYCODONE HYDROCHLORIDE 5 MG/1
5-10 TABLET ORAL EVERY 4 HOURS PRN
Status: DISCONTINUED | OUTPATIENT
Start: 2019-01-16 | End: 2019-01-17

## 2019-01-16 RX ORDER — HYDROMORPHONE HYDROCHLORIDE 1 MG/ML
0.2 INJECTION, SOLUTION INTRAMUSCULAR; INTRAVENOUS; SUBCUTANEOUS
Status: DISCONTINUED | OUTPATIENT
Start: 2019-01-16 | End: 2019-01-16

## 2019-01-16 RX ORDER — OXYCODONE HYDROCHLORIDE AND ACETAMINOPHEN 5; 325 MG/1; MG/1
1 TABLET ORAL
Status: DISCONTINUED | OUTPATIENT
Start: 2019-01-16 | End: 2019-01-16

## 2019-01-16 RX ORDER — ONDANSETRON 2 MG/ML
4 INJECTION INTRAMUSCULAR; INTRAVENOUS
Status: DISCONTINUED | OUTPATIENT
Start: 2019-01-16 | End: 2019-01-16

## 2019-01-16 RX ORDER — ONDANSETRON 2 MG/ML
4 INJECTION INTRAMUSCULAR; INTRAVENOUS EVERY 6 HOURS PRN
Status: DISCONTINUED | OUTPATIENT
Start: 2019-01-16 | End: 2019-01-17 | Stop reason: HOSPADM

## 2019-01-16 RX ORDER — DIPHENHYDRAMINE HCL 25 MG
25 TABLET ORAL EVERY 6 HOURS PRN
Status: DISCONTINUED | OUTPATIENT
Start: 2019-01-16 | End: 2019-01-17 | Stop reason: HOSPADM

## 2019-01-16 RX ORDER — OXYCODONE HYDROCHLORIDE AND ACETAMINOPHEN 5; 325 MG/1; MG/1
2 TABLET ORAL
Status: DISCONTINUED | OUTPATIENT
Start: 2019-01-16 | End: 2019-01-16

## 2019-01-16 RX ORDER — HYDROMORPHONE HYDROCHLORIDE 1 MG/ML
0.1 INJECTION, SOLUTION INTRAMUSCULAR; INTRAVENOUS; SUBCUTANEOUS
Status: DISCONTINUED | OUTPATIENT
Start: 2019-01-16 | End: 2019-01-16

## 2019-01-16 RX ADMIN — OXYCODONE HYDROCHLORIDE 10 MG: 5 TABLET ORAL at 11:32

## 2019-01-16 RX ADMIN — DOCUSATE SODIUM 100 MG: 100 CAPSULE, LIQUID FILLED ORAL at 22:04

## 2019-01-16 RX ADMIN — VALACYCLOVIR HYDROCHLORIDE 1000 MG: 500 TABLET, FILM COATED ORAL at 18:09

## 2019-01-16 RX ADMIN — VALACYCLOVIR HYDROCHLORIDE 1000 MG: 500 TABLET, FILM COATED ORAL at 04:55

## 2019-01-16 RX ADMIN — METFORMIN HYDROCHLORIDE 500 MG: 500 TABLET ORAL at 18:09

## 2019-01-16 RX ADMIN — OXYCODONE HYDROCHLORIDE 10 MG: 5 TABLET ORAL at 22:04

## 2019-01-16 RX ADMIN — METFORMIN HYDROCHLORIDE 500 MG: 500 TABLET ORAL at 07:43

## 2019-01-16 RX ADMIN — OXYCODONE HYDROCHLORIDE 10 MG: 5 TABLET ORAL at 02:09

## 2019-01-16 RX ADMIN — OXYCODONE HYDROCHLORIDE 10 MG: 5 TABLET ORAL at 18:09

## 2019-01-16 RX ADMIN — OXYCODONE HYDROCHLORIDE 10 MG: 5 TABLET ORAL at 06:36

## 2019-01-16 RX ADMIN — Medication 1 TABLET: at 04:55

## 2019-01-16 ASSESSMENT — PAIN SCALES - GENERAL
PAINLEVEL_OUTOF10: 5
PAINLEVEL_OUTOF10: 2
PAINLEVEL_OUTOF10: 7
PAINLEVEL_OUTOF10: 6
PAINLEVEL_OUTOF10: 0
PAINLEVEL_OUTOF10: 8
PAINLEVEL_OUTOF10: 0

## 2019-01-16 ASSESSMENT — EDINBURGH POSTNATAL DEPRESSION SCALE (EPDS)
I HAVE BEEN ANXIOUS OR WORRIED FOR NO GOOD REASON: YES, SOMETIMES
THINGS HAVE BEEN GETTING ON TOP OF ME: YES, SOMETIMES I HAVEN'T BEEN COPING AS WELL AS USUAL
THE THOUGHT OF HARMING MYSELF HAS OCCURRED TO ME: NEVER
I HAVE FELT SAD OR MISERABLE: NO, NOT AT ALL
I HAVE BEEN ABLE TO LAUGH AND SEE THE FUNNY SIDE OF THINGS: AS MUCH AS I ALWAYS COULD
I HAVE BLAMED MYSELF UNNECESSARILY WHEN THINGS WENT WRONG: NO, NEVER
I HAVE FELT SCARED OR PANICKY FOR NO GOOD REASON: NO, NOT AT ALL
I HAVE BEEN SO UNHAPPY THAT I HAVE HAD DIFFICULTY SLEEPING: NOT VERY OFTEN
I HAVE BEEN SO UNHAPPY THAT I HAVE BEEN CRYING: YES, QUITE OFTEN
I HAVE LOOKED FORWARD WITH ENJOYMENT TO THINGS: AS MUCH AS I EVER DID

## 2019-01-16 NOTE — PROGRESS NOTES
Pt is A&Ox4. FOB is at bedside. VSS. C/O cramping pain.  Will give pain meds per orders.  Up self.  Fundus is firm, lochia is light with no clots.  Mepilex silver dressing in place CDI.  MOB and FOB participating in infant care.  Call light within reach and working properly.

## 2019-01-16 NOTE — CARE PLAN
Problem: Alteration in comfort related to surgical incision and/or after birth pains  Goal: Patient is able to ambulate, care for self and infant with acceptable pain level  Outcome: PROGRESSING AS EXPECTED  Pt up in room caring for self and infant well.  Goal: Patient verbalizes acceptable pain level  Outcome: PROGRESSING AS EXPECTED  Pt states that pain is well controlled with current pain medications.

## 2019-01-16 NOTE — PROGRESS NOTES
Assessment complete. VSS. Fundus firm, lochia light. Pain controlled with prn medications per mar. Pt will call to request pain medications. Mepilex silver dressing in place, CDI. FOB at bedside bonding with pt and baby. POC discussed. Encouraged to call with needs. Call light in place.

## 2019-01-16 NOTE — LACTATION NOTE
Met with MOB for a lactation follow up visit.  Assistance offered with breastfeeding, but MOB declined.  MOB stated infant is latching onto the breast without difficulty and is feeding well.  MOB denied pain and tissue damage to nipples and areolas with latch.      Per MOB, infant is eating approximately every 1-3 hours and per I&O record, infant is voiding and stooling appropriately.  Weight loss for infant since birth is 3.65% which is within defined limits.    Breastfeeding plan remains unchanged.  MOB is to continue to feed infant on demand per feeding cues and within three hours from the last feed.    MOB verbalized understanding of all information provided to her and denied having any further questions at this time.  Encouraged MOB to call for lactation assistance as needed.

## 2019-01-16 NOTE — CARE PLAN
Problem: Communication  Goal: The ability to communicate needs accurately and effectively will improve  Pt educated on use of call light and white board for communication, pt verbalizes understanding of white board communication and times of rounding.      Problem: Pain Management  Goal: Pain level will decrease to patient's comfort goal  Pain assessed Q2h. Pain medication given per orders, see MAR.

## 2019-01-16 NOTE — PROGRESS NOTES
Obstetrics & Gynecology Post-Delivery Progress Note    Date of Service: 19    28 y.o.  s/p  for active HSV2- not on prophylaxis  Delivery date: 19    Events  No events    Subjective  Pain: Yes,  location incicison site pain  Bleeding: lochia minimal  PO's: taking regular diet  Voiding: without difficulty  Ambulating: yes  Passing flatus: Yes  Feeding: breastfeeding well    Objective  Temp:  [36.6 °C (97.8 °F)-37.4 °C (99.3 °F)] 36.6 °C (97.8 °F)  Pulse:  [] 116  Resp:  [16-20] 17  BP: (116-129)/(76-88) 116/78  SpO2:  [89 %-98 %] 89 %    Physical Exam  General: well  Chest/Breasts: nipples intact   Abdomen: lower abdominal tenderness at incision site  Fundus: firm below umbilicus  Incision: dressing clean, dry, intact  Perineum: deferred  Extremities: no edema, calves nontender    Recent Labs      19   1137  01/15/19   0655   WBC  8.7  11.7*   RBC  4.42  3.70*   HEMOGLOBIN  13.5  11.3*   HEMATOCRIT  40.9  33.9*   MCV  92.5  91.6   MCH  30.5  30.5   RDW  43.8  42.5   PLATELETCT  196  168   MPV  10.9  10.7       Assessment/Plan  Jeanne Miranda is a 37 y.o.  post op day  #2  s/p  for active HSV2- not on prophylaxis    1. Post care: meeting all goals  2. Hemodynamics: stable  3. Pain: controlled  4. Rh-, Rubella Immune  5. Method of Feeding: plans to breastfeed  6. Method of Contraception: nothing- still considering options  7. Disposition: likely home postop day 3    VTE prophylaxis: SCDs and ambulation    Demetrice Guerrier MD, PGY-1  [facility] Family Medicine, Residency

## 2019-01-17 VITALS
HEART RATE: 112 BPM | OXYGEN SATURATION: 96 % | WEIGHT: 227.29 LBS | RESPIRATION RATE: 18 BRPM | HEIGHT: 67 IN | DIASTOLIC BLOOD PRESSURE: 90 MMHG | SYSTOLIC BLOOD PRESSURE: 133 MMHG | TEMPERATURE: 97.9 F | BODY MASS INDEX: 35.67 KG/M2

## 2019-01-17 LAB
GLUCOSE BLD-MCNC: 100 MG/DL (ref 65–99)
GLUCOSE BLD-MCNC: 107 MG/DL (ref 65–99)
GLUCOSE BLD-MCNC: 81 MG/DL (ref 65–99)

## 2019-01-17 PROCEDURE — A9270 NON-COVERED ITEM OR SERVICE: HCPCS | Performed by: OBSTETRICS & GYNECOLOGY

## 2019-01-17 PROCEDURE — 82962 GLUCOSE BLOOD TEST: CPT

## 2019-01-17 PROCEDURE — 700102 HCHG RX REV CODE 250 W/ 637 OVERRIDE(OP): Performed by: NURSE PRACTITIONER

## 2019-01-17 PROCEDURE — 700102 HCHG RX REV CODE 250 W/ 637 OVERRIDE(OP): Performed by: OBSTETRICS & GYNECOLOGY

## 2019-01-17 PROCEDURE — A9270 NON-COVERED ITEM OR SERVICE: HCPCS | Performed by: NURSE PRACTITIONER

## 2019-01-17 RX ORDER — OXYCODONE HYDROCHLORIDE AND ACETAMINOPHEN 5; 325 MG/1; MG/1
1 TABLET ORAL EVERY 4 HOURS PRN
Qty: 20 TAB | Refills: 0 | Status: SHIPPED | OUTPATIENT
Start: 2019-01-17 | End: 2019-01-20

## 2019-01-17 RX ORDER — PSEUDOEPHEDRINE HCL 30 MG
100 TABLET ORAL 2 TIMES DAILY PRN
Qty: 60 CAP | Refills: 0 | Status: SHIPPED | OUTPATIENT
Start: 2019-01-17

## 2019-01-17 RX ORDER — VALACYCLOVIR HYDROCHLORIDE 1 G/1
1000 TABLET, FILM COATED ORAL 2 TIMES DAILY
Qty: 28 TAB | Refills: 0 | Status: SHIPPED | OUTPATIENT
Start: 2019-01-17 | End: 2019-01-17

## 2019-01-17 RX ORDER — OXYCODONE HYDROCHLORIDE AND ACETAMINOPHEN 5; 325 MG/1; MG/1
1 TABLET ORAL EVERY 4 HOURS PRN
Status: DISCONTINUED | OUTPATIENT
Start: 2019-01-17 | End: 2019-01-17 | Stop reason: HOSPADM

## 2019-01-17 RX ORDER — PSEUDOEPHEDRINE HCL 30 MG
100 TABLET ORAL 2 TIMES DAILY PRN
Qty: 60 CAP | Refills: 0 | Status: SHIPPED | OUTPATIENT
Start: 2019-01-17 | End: 2019-01-17

## 2019-01-17 RX ORDER — OXYCODONE HYDROCHLORIDE AND ACETAMINOPHEN 5; 325 MG/1; MG/1
1 TABLET ORAL EVERY 4 HOURS PRN
Qty: 20 TAB | Refills: 0 | Status: SHIPPED | OUTPATIENT
Start: 2019-01-17 | End: 2019-01-17

## 2019-01-17 RX ORDER — MORPHINE SULFATE 4 MG/ML
4 INJECTION, SOLUTION INTRAMUSCULAR; INTRAVENOUS ONCE
Status: DISCONTINUED | OUTPATIENT
Start: 2019-01-17 | End: 2019-01-17 | Stop reason: HOSPADM

## 2019-01-17 RX ORDER — OXYCODONE AND ACETAMINOPHEN 10; 325 MG/1; MG/1
1 TABLET ORAL EVERY 4 HOURS PRN
Status: DISCONTINUED | OUTPATIENT
Start: 2019-01-17 | End: 2019-01-17 | Stop reason: HOSPADM

## 2019-01-17 RX ORDER — VALACYCLOVIR HYDROCHLORIDE 1 G/1
1000 TABLET, FILM COATED ORAL 2 TIMES DAILY
Qty: 28 TAB | Refills: 0 | Status: SHIPPED | OUTPATIENT
Start: 2019-01-17 | End: 2019-01-31

## 2019-01-17 RX ADMIN — METFORMIN HYDROCHLORIDE 500 MG: 500 TABLET ORAL at 17:11

## 2019-01-17 RX ADMIN — OXYCODONE HYDROCHLORIDE AND ACETAMINOPHEN 1 TABLET: 10; 325 TABLET ORAL at 02:03

## 2019-01-17 RX ADMIN — VALACYCLOVIR HYDROCHLORIDE 1000 MG: 500 TABLET, FILM COATED ORAL at 06:02

## 2019-01-17 RX ADMIN — VALACYCLOVIR HYDROCHLORIDE 1000 MG: 500 TABLET, FILM COATED ORAL at 17:11

## 2019-01-17 RX ADMIN — METFORMIN HYDROCHLORIDE 500 MG: 500 TABLET ORAL at 07:42

## 2019-01-17 RX ADMIN — Medication 1 TABLET: at 06:02

## 2019-01-17 RX ADMIN — OXYCODONE AND ACETAMINOPHEN 1 TABLET: 5; 325 TABLET ORAL at 14:28

## 2019-01-17 RX ADMIN — OXYCODONE HYDROCHLORIDE AND ACETAMINOPHEN 1 TABLET: 10; 325 TABLET ORAL at 06:02

## 2019-01-17 RX ADMIN — OXYCODONE AND ACETAMINOPHEN 1 TABLET: 5; 325 TABLET ORAL at 10:26

## 2019-01-17 ASSESSMENT — PAIN SCALES - GENERAL
PAINLEVEL_OUTOF10: 5
PAINLEVEL_OUTOF10: 8
PAINLEVEL_OUTOF10: 7
PAINLEVEL_OUTOF10: 0
PAINLEVEL_OUTOF10: 6
PAINLEVEL_OUTOF10: 0
PAINLEVEL_OUTOF10: 0
PAINLEVEL_OUTOF10: 5

## 2019-01-17 NOTE — PROGRESS NOTES
Received bedside report from KRISTEL Antonio. Patient in bed with c/o pain but states she can wait for her next dose of oxy IR. Patient encouraged to call if she changes her mind. Whiteboards updated, POC discussed. Call light within reach. Patient encouraged to call with any needs and or concerns.

## 2019-01-17 NOTE — CARE PLAN
Problem: Potential for postpartum infection related to surgical incision, compromised uterine condition, urinary tract or respiratory compromise  Goal: Patient will be afebrile and free from signs and symptoms of infection  Outcome: PROGRESSING AS EXPECTED  Patient afebrile. Mepilex dressing CDI. No s/sx of infection at this time.     Problem: Alteration in comfort related to surgical incision and/or after birth pains  Goal: Patient is able to ambulate, care for self and infant with acceptable pain level  Outcome: PROGRESSING AS EXPECTED  Patient calling when needing pain medication. Ambulating and caring for self and infant.

## 2019-01-17 NOTE — PROGRESS NOTES
Obstetrics & Gynecology Post-Delivery Progress Note    Date of Service      28 y.o.  s/p  for active HSV-2 not on prophylaxis  Delivery date: 19 @0    Events  No events    Subjective  Pain: Yes,  location post surgical site  Bleeding: lochia minimal  PO's: taking regular diet  Voiding: without difficulty  Ambulating: yes  Passing flatus: Yes  Feeding: breastfeeding well    Objective  Temp:  [36.7 °C (98 °F)-37.1 °C (98.8 °F)] 36.7 °C (98 °F)  Pulse:  [105-110] 105  Resp:  [18-20] 18  BP: (135-139)/(84-90) 139/90  SpO2:  [94 %] 94 %    Physical Exam  General: well  Chest/Breasts: nipples intact   Abdomen: soft  Fundus: firm  Incision: dressing clean, dry, intact  Perineum: deferred  Extremities: trace edema b/l ankles, calves nontender    Recent Labs      19   1137  01/15/19   0655   WBC  8.7  11.7*   RBC  4.42  3.70*   HEMOGLOBIN  13.5  11.3*   HEMATOCRIT  40.9  33.9*   MCV  92.5  91.6   MCH  30.5  30.5   RDW  43.8  42.5   PLATELETCT  196  168   MPV  10.9  10.7       Assessment/Plan  Jeanne Miranda is a 37 y.o.  postpartum day #3  s/p  for active HSV2, not on prophylaxis    1. Post care: meeting all goals  2. Hemodynamics: stable  3. Pain: controlled  4. Rh-, Rubella Immune  5. Method of Feeding: plans to breastfeed  6. Method of Contraception: thinks she will plan on oral contraceptives but still deciding  7. Disposition: likely home post-op day 3-4     VTE prophylaxis: SCDs and ambulation    Demetrice Guerrier MD, PGY-1  [facility]   Family Medicine Residency

## 2019-01-17 NOTE — PROGRESS NOTES
Assessment complete. VSS. Fundus firm, lochia light. Pain controlled with prn medications per mar. Pt will call to request pain medications. Silver mepilex in place over c/s incision, CDI. FOB at bedside bonding with pt and baby. States voiding without difficulty. POC discussed. Encouraged to call with needs. Call light in place.

## 2019-01-17 NOTE — CARE PLAN
Problem: Pain Management  Goal: Pain level will decrease to patient's comfort goal  Outcome: PROGRESSING AS EXPECTED  Pain controlled through use of PRN pain medications. Pt calling when needing medications. RN will continue to reassess pain with each encounter and q4 hours.     Problem: Alteration in comfort related to surgical incision and/or after birth pains  Goal: Patient is able to ambulate, care for self and infant with acceptable pain level  Outcome: PROGRESSING AS EXPECTED  Pt is able to ambulate and perform daily activities without pain to incision site. PRN medications available if needed.

## 2019-01-18 NOTE — DISCHARGE INSTRUCTIONS
POSTPARTUM DISCHARGE INSTRUCTIONS FOR MOM    YOB: 1990   Age: 28 y.o.               Admit Date: 2019     Discharge Date: 2019  Attending Doctor:  Alvaro Diallo D.O.                  Allergies:  Nsaids and Tolmetin    Discharged to home by car. Discharged via wheelchair, hospital escort: Yes.  Special equipment needed: Not Applicable  Belongings with: Personal  Be sure to schedule a follow-up appointment with your primary care doctor or any specialists as instructed.     Discharge Plan:   Diet Plan: Discussed  Activity Level: Discussed  Confirmed Follow up Appointment: Patient to Call and Schedule Appointment  Confirmed Symptoms Management: Discussed    REASONS TO CALL YOUR OBSTETRICIAN:  1.   Persistent fever or shaking chills (Temperature higher than 100.4)  2.   Heavy bleeding (soaking more than 1 pad per hour); Passing clots  3.   Foul odor from vagina  4.   Mastitis (Breast infection; breast pain, chills, fever, redness)  5.   Urinary pain, burning or frequency  6.   Episiotomy infection  7.   Abdominal incision infection  8.   Severe depression longer than 24 hours    HAND WASHING  · Prior to handling the baby.  · Before breastfeeding or bottle feeding baby.  · After using the bathroom or changing the baby's diaper.    WOUND CARE  Ask your physician for additional care instructions.  In general:    ·  Incision:      · Keep clean and dry.    · Do NOT lift anything heavier than your baby for up to 6 weeks.    · There should not be any opening or pus.      VAGINAL CARE  · Nothing inside vagina for 6 weeks: no sexual intercourse, tampons or douching.  · Bleeding may continue for 2-4 weeks.  Amount may vary.    · Call your physician for heavy bleeding which means soaking more than 1 pad per hour    BIRTH CONTROL  · It is possible to become pregnant at any time after delivery and while breastfeeding.  · Plan to discuss a method of birth control with your physician at your follow up  "visit. visit.    DIET AND ELIMINATION  · Eating more fiber (bran cereal, fruits, and vegetables) and drinking plenty of fluids will help to avoid constipation.  · Urinary frequency after childbirth is normal.    POSTPARTUM BLUES  During the first few days after birth, you may experience a sense of the \"blues\" which may include impatience, irritability or even crying.  These feeling come and go quickly.  However, as many as 1 in 10 women experience emotional symptoms known as postpartum depression.    Postpartum depression:  May start as early as the second or third day after delivery or take several weeks or months to develop.  Symptoms of \"blues\" are present, but are more intense:  Crying spells; loss of appetite; feelings of hopelessness or loss of control; fear of touching the baby; over concern or no concern at all about the baby; little or no concern about your own appearance/caring for yourself; and/or inability to sleep or excessive sleeping.  Contact your physician if you are experiencing any of these symptoms.    Crisis Hotline:  · [place] Crisis Hotline:  9-895-BCNIOJJ  Or 1-780.733.7587  · [place] Crisis Hotline:  1-319.818.1174  Or 507-588-7308    PREVENTING SHAKEN BABY:  If you are angry or stressed, PUT THE BABY IN THE CRIB, step away, take some deep breaths, and wait until you are calm to care for the baby.  DO NOT SHAKE THE BABY.  You are not alone, call a supporter for help.    · Crisis Call Center 24/7 crisis line 652-737-4422 or 1-334.781.1546  · You can also text them, text \"ANSWER\" to 787188    QUIT SMOKING/TOBACCO USE:  I understand the use of any tobacco products increases my chance of suffering from future heart disease and could cause other illnesses which may shorten my life. Quitting the use of tobacco products is the single most important thing I can do to improve my health. For further information on smoking / tobacco cessation call a Toll Free Quit Line at 1-963.375.6263 (*National " Cancer [place]) or 1-600.248.1967 (American Lung Association) or you can access the web based program at www.lungusa.org.    · [place] Tobacco Users Help Line:  (929) 835-2998       Toll Free: 1-410.161.1319  · Quit Tobacco Program [facility] (219)147-5436    DEPRESSION / SUICIDE RISK:  As you are discharged from this [facility], it is important to learn how to keep safe from harming yourself.    Recognize the warning signs:  · Abrupt changes in personality, positive or negative- including increase in energy   · Giving away possessions  · Change in eating patterns- significant weight changes-  positive or negative  · Change in sleeping patterns- unable to sleep or sleeping all the time   · Unwillingness or inability to communicate  · Depression  · Unusual sadness, discouragement and loneliness  · Talk of wanting to die  · Neglect of personal appearance   · Rebelliousness- reckless behavior  · Withdrawal from people/activities they love  · Confusion- inability to concentrate     If you or a loved one observes any of these behaviors or has concerns about self-harm, here's what you can do:  · Talk about it- your feelings and reasons for harming yourself  · Remove any means that you might use to hurt yourself (examples: pills, rope, extension cords, firearm)  · Get professional help from the community (Mental Health, Substance Abuse, psychological counseling)  · Do not be alone:Call your Safe Contact- someone whom you trust who will be there for you.  · Call your local CRISIS HOTLINE 370-4464 or 181-024-5691  · Call your local Children's Mobile Crisis Response Team [place] (468) 938-6062 or www.[employer]  · Call the toll free National Suicide Prevention Hotlines   · National Suicide Prevention Lifeline 512-412-MSRU (3593)  · National Hope Line Network 800-SUICIDE (604-7372)    DISCHARGE SURVEY:  Thank you for choosing [facility].  We hope we provided you with very good  care.  You may be receiving a survey in the mail.  Please fill it out.  Your opinion is valuable to us.    ADDITIONAL EDUCATIONAL MATERIALS GIVEN TO PATIENT:        My signature on this form indicates that:  1.  I have reviewed and understand the above information  2.  My questions regarding this information have been answered to my satisfaction.  3.  I have formulated a plan with my discharge nurse to obtain my prescribed medication for home.

## 2019-01-18 NOTE — DISCHARGE SUMMARY
Discharge Summary:       [place] resident: Demetrice Guerrier MD, PGY-1       Attending: Dr. Lyly KIDD    Date of Admission: 2019  Date of Discharge: 19    Admitting diagnosis:    1. Pregnancy @ 37w1d  2. Active HSV2, not on prophylaxis  3. Gestational hypertension  4. GDMA2  5. Rh -    Discharge Diagnosis:    1. Status post  for active HSV2 lesions.  2. Active HSV2 lesions  3. Gestational hypertension  4. GDMA2  5. Rh -    Pregnancy Complications: none  Tubal Ligation: No    History reviewed. No pertinent past medical history.  OB History    Para Term  AB Living   2 2 2     2   SAB TAB Ectopic Molar Multiple Live Births           0 2      # Outcome Date GA Lbr Alfred/2nd Weight Sex Delivery Anes PTL Lv   2 Term 19 37w1d  3.73 kg (8 lb 3.6 oz) M CS-LTranv Spinal N YUSUF   1 Term 17 40w0d  3.969 kg (8 lb 12 oz) M Vag-Spont  N YUSUF      Complications: Pre-eclampsia        Past Surgical History:   Procedure Laterality Date   • PRIMARY C SECTION  2019    Procedure: PRIMARY C SECTION;  Surgeon: Ofe Lund D.O.;  Location: LABOR AND DELIVERY;  Service: Obstetrics     Allergies: Nsaids and Tolmetin    Patient Active Problem List   Diagnosis   • 23 weeks gestation of pregnancy   • History of gestational diabetes mellitus (GDM)   • Gestational diabetes   • Rh negative state in antepartum period   • Pregnancy induced hypertension, antepartum   • Encounter for supervision of high risk pregnancy in third trimester, antepartum   • Delivery by  section of full-term infant     Hospital Course:   37 y.o. , now para 2, was admitted with the above mentioned diagnosis, underwent Primary  gestational hypertension and active HSV2 outbreak. Patient postpartum course was unremarkable, with progressive advancement in diet , ambulation and toleration of oral analgesia. Patient without complaints today and desires discharge.      Physical Exam:  Temp:   [36.6 °C (97.9 °F)-36.7 °C (98 °F)] 36.6 °C (97.9 °F)  Pulse:  [105-112] 112  Resp:  [18] 18  BP: (133-139)/(90) 133/90  SpO2:  [94 %-96 %] 96 %     Physical Exam  General: well  Chest/Breasts: nipples intact   Abdomen: soft  Fundus: firm  Incision: dressing clean, dry, intact  Perineum: deferred  Extremities: symmetric, calves nontender    Current Facility-Administered Medications   Medication Dose   • oxyCODONE-acetaminophen (PERCOCET-10)  MG per tablet 1 Tab  1 Tab   • oxyCODONE-acetaminophen (PERCOCET) 5-325 MG per tablet 1 Tab  1 Tab   • morphine (pf) 4 mg/ml injection 4 mg  4 mg   • ondansetron (ZOFRAN) syringe/vial injection 4 mg  4 mg    Or   • ondansetron (ZOFRAN ODT) dispertab 4 mg  4 mg   • diphenhydrAMINE (BENADRYL) tablet/capsule 25 mg  25 mg    Or   • diphenhydrAMINE (BENADRYL) injection 25 mg  25 mg   • LR infusion     • docusate sodium (COLACE) capsule 100 mg  100 mg   • bisacodyl (DULCOLAX) suppository 10 mg  10 mg   • simethicone (MYLICON) chewable tab 80 mg  80 mg   • prenatal plus vitamin (STUARTNATAL 1+1) 27-1 MG tablet 1 Tab  1 Tab   • lactated ringers (LR) infusion     • metFORMIN (GLUCOPHAGE) tablet 500 mg  500 mg   • calcium carbonate (TUMS) chewable tab 500 mg  500 mg   • mag hydrox-al hydrox-simeth (MAALOX PLUS ES or MYLANTA DS) suspension 10 mL  10 mL   • metFORMIN (GLUCOPHAGE) tablet 500 mg  500 mg   • valACYclovir (VALTREX) caplet 1,000 mg  1,000 mg   • NS infusion         Recent Labs      01/15/19   0655   WBC  11.7*   RBC  3.70*   HEMOGLOBIN  11.3*   HEMATOCRIT  33.9*   MCV  91.6   MCH  30.5   MCHC  33.3*   RDW  42.5   PLATELETCT  168   MPV  10.7       Activity:   Discharge to home  Pelvic Rest x 6 weeks  Call or come to ED for: heavy vaginal bleeding, fever >100.4, severe abdominal pain, severe headache, chest pain, shortness of breath,  N/V, incisional drainage, or other concerns.      Assessment:  - Normal postpartum course     Follow up:   -[facility] or [facility]  weeks for vaginal ; 1 week for incision check.   - Wants to continue to discuss contraceptive options before making a decision  - Counseled on condom use for contraception in the interim     Discharge Meds:   Current Outpatient Prescriptions   Medication Sig Dispense Refill   • oxyCODONE-acetaminophen (PERCOCET) 5-325 MG Tab Take 1 Tab by mouth every four hours as needed for up to 3 days. 20 Tab 0   • docusate sodium 100 MG Cap Take 100 mg by mouth 2 times a day as needed for Constipation. 60 Cap 0   • valACYclovir (VALTREX) 1 GM Tab Take 1 Tab by mouth 2 Times a Day for 14 days. 28 Tab 0     CC: Dr. Diallo

## 2019-01-23 ENCOUNTER — POST PARTUM (OUTPATIENT)
Dept: OBGYN | Facility: MEDICAL CENTER | Age: 29
End: 2019-01-23
Payer: COMMERCIAL

## 2019-01-23 VITALS
SYSTOLIC BLOOD PRESSURE: 122 MMHG | BODY MASS INDEX: 31.83 KG/M2 | WEIGHT: 202.82 LBS | DIASTOLIC BLOOD PRESSURE: 82 MMHG

## 2019-01-23 DIAGNOSIS — Z86.32 HISTORY OF GESTATIONAL DIABETES MELLITUS (GDM): ICD-10-CM

## 2019-01-23 PROCEDURE — 90040 PR PRENATAL FOLLOW UP: CPT | Performed by: OBSTETRICS & GYNECOLOGY

## 2019-01-23 NOTE — PROGRESS NOTES
S: Pt presents for postoperative incision check s/p primary  section for HSV 2 outbreak and elevated BPs.   She has normal BM, no dysuria. Denies HA, BV, nausea or vomiting.   Breastfeeding well without issue.   Pt c/o whole body rash which started 2 days ago and she woke up this morning with swollen eyes and lips. She did not change anything about her laundry or diet.     Questions answered.    O: /82   Wt 92 kg (202 lb 13.2 oz)   LMP 2018   BMI 31.83 kg/m²     Gen: AAO in NAD  Abd: soft, nontender, no rebound or guarding, Meplex dressing removed and incision noted to be healed well.   Ext: nontender bilaterally. No edema.     Lab:  Recent Results (from the past 336 hour(s))   POC UA    Collection Time: 19  8:01 PM   Result Value Ref Range    POC Color Yellow     POC Appearance Clear     POC Glucose Negative Negative mg/dL    POC Ketones 15 (A) Negative mg/dL    POC Specific Gravity 1.025 1.005 - 1.030    POC Blood Negative Negative    POC Urine PH 5.5 5.0 - 8.0    POC Protein Negative Negative mg/dL    POC Nitrites Negative Negative    POC Leukocyte Esterase Negative Negative   CBC WITH DIFFERENTIAL    Collection Time: 19  8:08 PM   Result Value Ref Range    WBC 9.5 4.8 - 10.8 K/uL    RBC 4.51 4.20 - 5.40 M/uL    Hemoglobin 13.2 12.0 - 16.0 g/dL    Hematocrit 41.7 37.0 - 47.0 %    MCV 92.5 81.4 - 97.8 fL    MCH 29.3 27.0 - 33.0 pg    MCHC 31.7 (L) 33.6 - 35.0 g/dL    RDW 43.7 35.9 - 50.0 fL    Platelet Count 201 164 - 446 K/uL    MPV 10.7 9.0 - 12.9 fL    Neutrophils-Polys 64.80 44.00 - 72.00 %    Lymphocytes 26.30 22.00 - 41.00 %    Monocytes 7.30 0.00 - 13.40 %    Eosinophils 0.60 0.00 - 6.90 %    Basophils 0.40 0.00 - 1.80 %    Immature Granulocytes 0.60 0.00 - 0.90 %    Nucleated RBC 0.00 /100 WBC    Neutrophils (Absolute) 6.17 2.00 - 7.15 K/uL    Lymphs (Absolute) 2.51 1.00 - 4.80 K/uL    Monos (Absolute) 0.70 0.00 - 0.85 K/uL    Eos (Absolute) 0.06 0.00 - 0.51 K/uL    Baso  (Absolute) 0.04 0.00 - 0.12 K/uL    Immature Granulocytes (abs) 0.06 0.00 - 0.11 K/uL    NRBC (Absolute) 0.00 K/uL   COMP METABOLIC PANEL    Collection Time: 01/12/19  8:08 PM   Result Value Ref Range    Sodium 136 135 - 145 mmol/L    Potassium 5.3 3.6 - 5.5 mmol/L    Chloride 108 96 - 112 mmol/L    Co2 17 (L) 20 - 33 mmol/L    Anion Gap 11.0 0.0 - 11.9    Glucose 111 (H) 65 - 99 mg/dL    Bun 10 8 - 22 mg/dL    Creatinine 0.56 0.50 - 1.40 mg/dL    Calcium 9.3 8.5 - 10.5 mg/dL    AST(SGOT) 14 12 - 45 U/L    ALT(SGPT) 5 2 - 50 U/L    Alkaline Phosphatase 104 (H) 30 - 99 U/L    Total Bilirubin 0.3 0.1 - 1.5 mg/dL    Albumin 3.6 3.2 - 4.9 g/dL    Total Protein 6.9 6.0 - 8.2 g/dL    Globulin 3.3 1.9 - 3.5 g/dL    A-G Ratio 1.1 g/dL   URIC ACID    Collection Time: 01/12/19  8:08 PM   Result Value Ref Range    Uric Acid 4.8 1.9 - 8.2 mg/dL   HOLD BLOOD BANK SPECIMEN (NOT TESTED)    Collection Time: 01/12/19  8:08 PM   Result Value Ref Range    Holding Tube - Bb DONE    ESTIMATED GFR    Collection Time: 01/12/19  8:08 PM   Result Value Ref Range    GFR If African American >60 >60 mL/min/1.73 m 2    GFR If Non African American >60 >60 mL/min/1.73 m 2   ACCU-CHEK GLUCOSE    Collection Time: 01/13/19  1:56 AM   Result Value Ref Range    Glucose - Accu-Ck 106 (H) 65 - 99 mg/dL   CBC WITHOUT DIFFERENTIAL    Collection Time: 01/13/19  5:15 AM   Result Value Ref Range    WBC 9.4 4.8 - 10.8 K/uL    RBC 3.95 (L) 4.20 - 5.40 M/uL    Hemoglobin 12.1 12.0 - 16.0 g/dL    Hematocrit 36.8 (L) 37.0 - 47.0 %    MCV 93.2 81.4 - 97.8 fL    MCH 30.6 27.0 - 33.0 pg    MCHC 32.9 (L) 33.6 - 35.0 g/dL    RDW 43.7 35.9 - 50.0 fL    Platelet Count 183 164 - 446 K/uL    MPV 10.9 9.0 - 12.9 fL   URIC ACID    Collection Time: 01/13/19  5:15 AM   Result Value Ref Range    Uric Acid 5.5 1.9 - 8.2 mg/dL   COMP METABOLIC PANEL    Collection Time: 01/13/19  5:15 AM   Result Value Ref Range    Co2 20 20 - 33 mmol/L    Glucose 103 (H) 65 - 99 mg/dL    Bun 8 8  - 22 mg/dL    Creatinine 0.55 0.50 - 1.40 mg/dL    Calcium 8.7 8.5 - 10.5 mg/dL    AST(SGOT) 7 (L) 12 - 45 U/L    ALT(SGPT) <5 2 - 50 U/L    Alkaline Phosphatase 100 (H) 30 - 99 U/L    Total Bilirubin 0.3 0.1 - 1.5 mg/dL    Albumin 3.1 (L) 3.2 - 4.9 g/dL    Total Protein 5.6 (L) 6.0 - 8.2 g/dL    Globulin 2.5 1.9 - 3.5 g/dL    A-G Ratio 1.2 g/dL    Sodium 136 135 - 145 mmol/L    Potassium 3.6 3.6 - 5.5 mmol/L    Chloride 107 96 - 112 mmol/L    Anion Gap 9.0 0.0 - 11.9   ESTIMATED GFR    Collection Time: 01/13/19  5:15 AM   Result Value Ref Range    GFR If African American >60 >60 mL/min/1.73 m 2    GFR If Non African American >60 >60 mL/min/1.73 m 2   ACCU-CHEK GLUCOSE    Collection Time: 01/13/19  6:36 AM   Result Value Ref Range    Glucose - Accu-Ck 96 65 - 99 mg/dL   ACCU-CHEK GLUCOSE    Collection Time: 01/13/19 10:02 AM   Result Value Ref Range    Glucose - Accu-Ck 137 (H) 65 - 99 mg/dL   ACCU-CHEK GLUCOSE    Collection Time: 01/13/19  1:16 PM   Result Value Ref Range    Glucose - Accu-Ck 109 (H) 65 - 99 mg/dL   ACCU-CHEK GLUCOSE    Collection Time: 01/13/19  6:30 PM   Result Value Ref Range    Glucose - Accu-Ck 103 (H) 65 - 99 mg/dL   URINETOTAL PROTEIN 24 HR    Collection Time: 01/13/19 11:20 PM   Result Value Ref Range    Total Protein, Urine 8.1 0.0 - 15.0 mg/dL    Total Volume, Urine 2800 mL    Total Protein, 24 Hour Urine 226.8 (H) 30.0 - 150.0 mg/24 Hr   ACCU-CHEK GLUCOSE    Collection Time: 01/14/19  6:02 AM   Result Value Ref Range    Glucose - Accu-Ck 78 65 - 99 mg/dL   ACCU-CHEK GLUCOSE    Collection Time: 01/14/19  9:52 AM   Result Value Ref Range    Glucose - Accu-Ck 104 (H) 65 - 99 mg/dL   CBC WITHOUT DIFFERENTIAL    Collection Time: 01/14/19 11:37 AM   Result Value Ref Range    WBC 8.7 4.8 - 10.8 K/uL    RBC 4.42 4.20 - 5.40 M/uL    Hemoglobin 13.5 12.0 - 16.0 g/dL    Hematocrit 40.9 37.0 - 47.0 %    MCV 92.5 81.4 - 97.8 fL    MCH 30.5 27.0 - 33.0 pg    MCHC 33.0 (L) 33.6 - 35.0 g/dL    RDW 43.8  35.9 - 50.0 fL    Platelet Count 196 164 - 446 K/uL    MPV 10.9 9.0 - 12.9 fL   ALANINE AMINO-TRANS    Collection Time: 01/14/19 11:37 AM   Result Value Ref Range    ALT(SGPT) <5 2 - 50 U/L   ASPARTATE AMINO-VANCE    Collection Time: 01/14/19 11:37 AM   Result Value Ref Range    AST(SGOT) 9 (L) 12 - 45 U/L   CREATININE    Collection Time: 01/14/19 11:37 AM   Result Value Ref Range    Creatinine 0.58 0.50 - 1.40 mg/dL   ESTIMATED GFR    Collection Time: 01/14/19 11:37 AM   Result Value Ref Range    GFR If African American >60 >60 mL/min/1.73 m 2    GFR If Non African American >60 >60 mL/min/1.73 m 2   ACCU-CHEK GLUCOSE    Collection Time: 01/14/19  1:59 PM   Result Value Ref Range    Glucose - Accu-Ck 96 65 - 99 mg/dL   PROTEIN/CREAT RATIO URINE    Collection Time: 01/14/19  5:00 PM   Result Value Ref Range    Total Protein, Urine 23.0 (H) 0.0 - 15.0 mg/dL    Creatinine, Random Urine 208.80 mg/dL    Protein Creatinine Ratio 110 (H) 10 - 107 mg/g   MOM RHHDN/RHOGAM    Collection Time: 01/15/19  3:15 AM   Result Value Ref Range    Number Of Rh Doses Indicated ZERO    CBC without differential    Collection Time: 01/15/19  6:55 AM   Result Value Ref Range    WBC 11.7 (H) 4.8 - 10.8 K/uL    RBC 3.70 (L) 4.20 - 5.40 M/uL    Hemoglobin 11.3 (L) 12.0 - 16.0 g/dL    Hematocrit 33.9 (L) 37.0 - 47.0 %    MCV 91.6 81.4 - 97.8 fL    MCH 30.5 27.0 - 33.0 pg    MCHC 33.3 (L) 33.6 - 35.0 g/dL    RDW 42.5 35.9 - 50.0 fL    Platelet Count 168 164 - 446 K/uL    MPV 10.7 9.0 - 12.9 fL   HISTOLOGY REQUEST    Collection Time: 01/15/19  7:47 AM   Result Value Ref Range    Pathology Request Sent to [facility]    ACCU-CHEK GLUCOSE    Collection Time: 01/15/19  9:28 AM   Result Value Ref Range    Glucose - Accu-Ck 110 (H) 65 - 99 mg/dL   ACCU-CHEK GLUCOSE    Collection Time: 01/15/19  2:18 PM   Result Value Ref Range    Glucose - Accu-Ck 140 (H) 65 - 99 mg/dL   ACCU-CHEK GLUCOSE    Collection Time: 01/16/19  4:59 AM   Result Value Ref Range     Glucose - Accu-Ck 128 (H) 65 - 99 mg/dL   ACCU-CHEK GLUCOSE    Collection Time: 19  9:05 AM   Result Value Ref Range    Glucose - Accu-Ck 108 (H) 65 - 99 mg/dL   ACCU-CHEK GLUCOSE    Collection Time: 19  1:14 PM   Result Value Ref Range    Glucose - Accu-Ck 127 (H) 65 - 99 mg/dL   ACCU-CHEK GLUCOSE    Collection Time: 19  6:50 PM   Result Value Ref Range    Glucose - Accu-Ck 127 (H) 65 - 99 mg/dL   ACCU-CHEK GLUCOSE    Collection Time: 19  6:04 AM   Result Value Ref Range    Glucose - Accu-Ck 81 65 - 99 mg/dL   ACCU-CHEK GLUCOSE    Collection Time: 19  8:33 AM   Result Value Ref Range    Glucose - Accu-Ck 107 (H) 65 - 99 mg/dL   ACCU-CHEK GLUCOSE    Collection Time: 19  1:27 PM   Result Value Ref Range    Glucose - Accu-Ck 100 (H) 65 - 99 mg/dL         A/P:  28 y.o.  postop 2 weeks from primary  section for PEC with HSV 2 outbreak. Doing well.   - Breast feeding well  - Advised to take Benadryl if swelling or whole body itching occurs again. She is to go to ED if throat itching or irritation occurs in case of anaphylaxis.  - Discussed birth control options including Nexplanon, IUD, OCPs, ring, and patch. Pt declines reading materials and wishes to do Internet research on her own. Will readdress at postpartum visit.   - hx PEC: BP returned to baseline  - Hx GDMA: plan to do 75mg glucose testing at 6wk visit.   - f/u in 4wks

## 2019-01-23 NOTE — PROGRESS NOTES
Pt presents for 2 week PP incision check.She is S/P ltcs 1/14/2019. Doing welL, pain is minimal, breastfeeding without problems.

## 2019-02-13 ENCOUNTER — HOSPITAL ENCOUNTER (OUTPATIENT)
Facility: MEDICAL CENTER | Age: 29
End: 2019-02-13
Attending: OBSTETRICS & GYNECOLOGY
Payer: COMMERCIAL

## 2019-02-13 ENCOUNTER — POST PARTUM (OUTPATIENT)
Dept: OBGYN | Facility: MEDICAL CENTER | Age: 29
End: 2019-02-13
Payer: COMMERCIAL

## 2019-02-13 VITALS — BODY MASS INDEX: 32.18 KG/M2 | SYSTOLIC BLOOD PRESSURE: 120 MMHG | WEIGHT: 205 LBS | DIASTOLIC BLOOD PRESSURE: 80 MMHG

## 2019-02-13 DIAGNOSIS — O24.415 GESTATIONAL DIABETES MELLITUS (GDM) CONTROLLED ON ORAL HYPOGLYCEMIC DRUG, ANTEPARTUM: ICD-10-CM

## 2019-02-13 PROCEDURE — 87624 HPV HI-RISK TYP POOLED RSLT: CPT

## 2019-02-13 PROCEDURE — 88175 CYTOPATH C/V AUTO FLUID REDO: CPT

## 2019-02-13 PROCEDURE — 90050 PR POSTPARTUM VISIT: CPT | Performed by: OBSTETRICS & GYNECOLOGY

## 2019-02-13 ASSESSMENT — EDINBURGH POSTNATAL DEPRESSION SCALE (EPDS)
THINGS HAVE BEEN GETTING ON TOP OF ME: NO, I HAVE BEEN COPING AS WELL AS EVER
I HAVE FELT SAD OR MISERABLE: NO, NOT AT ALL
I HAVE LOOKED FORWARD WITH ENJOYMENT TO THINGS: AS MUCH AS I EVER DID
I HAVE LOOKED FORWARD WITH ENJOYMENT TO THINGS: AS MUCH AS I EVER DID
I HAVE BLAMED MYSELF UNNECESSARILY WHEN THINGS WENT WRONG: NO, NEVER
I HAVE BEEN ABLE TO LAUGH AND SEE THE FUNNY SIDE OF THINGS: AS MUCH AS I ALWAYS COULD
THE THOUGHT OF HARMING MYSELF HAS OCCURRED TO ME: NEVER
TOTAL SCORE: 2
I HAVE FELT SCARED OR PANICKY FOR NO GOOD REASON: NO, NOT AT ALL
THE THOUGHT OF HARMING MYSELF HAS OCCURRED TO ME: NEVER
THINGS HAVE BEEN GETTING ON TOP OF ME: NO, I HAVE BEEN COPING AS WELL AS EVER
I HAVE BEEN ABLE TO LAUGH AND SEE THE FUNNY SIDE OF THINGS: AS MUCH AS I ALWAYS COULD
I HAVE BEEN ANXIOUS OR WORRIED FOR NO GOOD REASON: HARDLY EVER
I HAVE BEEN SO UNHAPPY THAT I HAVE BEEN CRYING: ONLY OCCASIONALLY
I HAVE BEEN SO UNHAPPY THAT I HAVE HAD DIFFICULTY SLEEPING: NOT AT ALL
I HAVE BEEN SO UNHAPPY THAT I HAVE HAD DIFFICULTY SLEEPING: NOT AT ALL
I HAVE BLAMED MYSELF UNNECESSARILY WHEN THINGS WENT WRONG: NO, NEVER
I HAVE BEEN SO UNHAPPY THAT I HAVE BEEN CRYING: ONLY OCCASIONALLY
I HAVE BEEN ANXIOUS OR WORRIED FOR NO GOOD REASON: NO, NOT AT ALL
I HAVE FELT SCARED OR PANICKY FOR NO GOOD REASON: NO, NOT AT ALL

## 2019-02-13 NOTE — PROGRESS NOTES
Twyla Simpson is a 28 y.o. female who presents for her Postpartum Exam      HPI: Pt presents for postpartum exam s/p  for HSV2 outbreak on 2019. Patient is without complaints.  Baby doing well.  Breast/bottle feeding.  No depression/anxiety. [place] score of 2.  Not sexually active.  Lochia resolved.  Desires condoms for contraception. Declines COCs or implants at this time.     Review of Systems:   Pertinent positives documented in HPI and all other systems reviewed & are negative    Last pap: unknown    Physical exam:   Vital measurements:  Blood pressure 120/80, weight 93 kg (205 lb), last menstrual period 2018, currently breastfeeding.  Body mass index is 32.18 kg/m². (Goal BMI>18 <25)  Nursing note and vitals reviewed.  Gen: She is oriented to person, place, and time. She appears well-developed and well-nourished. No distress.   Heart: Heart regular rate and rhythm  Lungs: clear to auscultation bilaterally  Breasts: nontender, not engorged, no dominant masses, nipples intact  Abdomen: soft, nontender, nondistended, no rebound/guarding  Extremities: nontender, no clubbing, cyanosis or edema  Pelvic: Normal external female genitalia, well-healed perineum from delivery, cervix is normal, uterus approximately 6 weeks in size non tender, adnexa bilaterally normal with no dominant masses    A/P: Postpartum status post  for HSV2 outbreak with PEC w/o SF  - GDM: 2 hour glucose test today.   - Continue prenatal vitamins while breast feeding  - May resume normal activities including exercise, tampons, and intercourse. May take tube baths.   - Pap smear collected today.  Discussed various methods of contraception with the patient including OCs, ring, patch, DepoProvera, IUD, Nexplanon, and tubal ligation. Also discussed barrier method and rhythm methods.   Contraception condoms   Follow up in 1 for annual exam or sooner as needed

## 2019-02-13 NOTE — PROGRESS NOTES
Pt presents for PP visit.Pt is doing well, S/P LTCS 1/14/2019. She admits to continued bleeding. Breastfeeding without difficulty. Will use condoms for bc.Last pap ? [place] scale=2

## 2019-02-14 LAB
CYTOLOGY REG CYTOL: NORMAL
HPV HR 12 DNA CVX QL NAA+PROBE: NEGATIVE
HPV16 DNA SPEC QL NAA+PROBE: NEGATIVE
HPV18 DNA SPEC QL NAA+PROBE: NEGATIVE
SPECIMEN SOURCE: NORMAL

## 2019-03-01 ENCOUNTER — HOSPITAL ENCOUNTER (OUTPATIENT)
Dept: LAB | Facility: MEDICAL CENTER | Age: 29
End: 2019-03-01
Attending: OBSTETRICS & GYNECOLOGY
Payer: COMMERCIAL

## 2019-03-01 DIAGNOSIS — O24.415 GESTATIONAL DIABETES MELLITUS (GDM) CONTROLLED ON ORAL HYPOGLYCEMIC DRUG, ANTEPARTUM: ICD-10-CM

## 2019-03-01 LAB
GLUCOSE 1.5H P CHAL SERPL-MCNC: 136 MG/DL (ref 65–139)
GLUCOSE 1H P CHAL SERPL-MCNC: 111 MG/DL (ref 65–199)
GLUCOSE 2H P CHAL SERPL-MCNC: 63 MG/DL (ref 65–139)
GLUCOSE 30M P CHAL SERPL-MCNC: 131 MG/DL (ref 65–199)
GLUCOSE BS SERPL-MCNC: 84 MG/DL (ref 65–99)

## 2019-03-01 PROCEDURE — 82951 GLUCOSE TOLERANCE TEST (GTT): CPT

## 2019-03-01 PROCEDURE — 82952 GTT-ADDED SAMPLES: CPT | Mod: 91

## 2019-03-01 PROCEDURE — 36415 COLL VENOUS BLD VENIPUNCTURE: CPT

## 2019-03-07 ENCOUNTER — TELEPHONE (OUTPATIENT)
Dept: OBGYN | Facility: MEDICAL CENTER | Age: 29
End: 2019-03-07

## 2019-03-07 NOTE — TELEPHONE ENCOUNTER
----- Message from Alvaro Diallo D.O. sent at 3/5/2019  8:09 AM PST -----  Please call the patient. She passed the 2 hour glucose test and does not have diabetes.     Thank you

## 2022-08-26 ENCOUNTER — OFFICE VISIT (OUTPATIENT)
Dept: INTERNAL MEDICINE CLINIC | Facility: CLINIC | Age: 32
End: 2022-08-26
Payer: COMMERCIAL

## 2022-08-26 VITALS
HEART RATE: 108 BPM | DIASTOLIC BLOOD PRESSURE: 97 MMHG | OXYGEN SATURATION: 96 % | BODY MASS INDEX: 34.6 KG/M2 | WEIGHT: 233.6 LBS | TEMPERATURE: 98.1 F | HEIGHT: 69 IN | SYSTOLIC BLOOD PRESSURE: 145 MMHG

## 2022-08-26 DIAGNOSIS — R03.0 ELEVATED BLOOD PRESSURE READING IN OFFICE WITHOUT DIAGNOSIS OF HYPERTENSION: ICD-10-CM

## 2022-08-26 DIAGNOSIS — Q67.7 PECTUS CARINATUM: ICD-10-CM

## 2022-08-26 DIAGNOSIS — D22.9 CHANGING NEVUS: ICD-10-CM

## 2022-08-26 DIAGNOSIS — E66.9 OBESITY (BMI 30-39.9): ICD-10-CM

## 2022-08-26 DIAGNOSIS — Z86.32 HX GESTATIONAL DIABETES: ICD-10-CM

## 2022-08-26 DIAGNOSIS — R00.0 TACHYCARDIA: ICD-10-CM

## 2022-08-26 DIAGNOSIS — E05.90 HYPERTHYROIDISM: ICD-10-CM

## 2022-08-26 DIAGNOSIS — Z76.89 ESTABLISHING CARE WITH NEW DOCTOR, ENCOUNTER FOR: Primary | ICD-10-CM

## 2022-08-26 PROCEDURE — 99204 OFFICE O/P NEW MOD 45 MIN: CPT | Performed by: INTERNAL MEDICINE

## 2022-08-26 RX ORDER — HYDROCHLOROTHIAZIDE 25 MG/1
25 TABLET ORAL DAILY
COMMUNITY
Start: 2022-08-16 | End: 2022-08-26

## 2022-08-26 RX ORDER — PROPRANOLOL HCL 60 MG
60 CAPSULE, EXTENDED RELEASE 24HR ORAL DAILY
Qty: 90 CAPSULE | Refills: 0 | Status: SHIPPED | OUTPATIENT
Start: 2022-08-26 | End: 2022-10-27 | Stop reason: ALTCHOICE

## 2022-08-26 RX ORDER — METHIMAZOLE 5 MG/1
5 TABLET ORAL 3 TIMES DAILY
Qty: 90 TABLET | Refills: 4 | Status: SHIPPED | OUTPATIENT
Start: 2022-08-26 | End: 2022-09-14 | Stop reason: SDUPTHER

## 2022-08-26 ASSESSMENT — PATIENT HEALTH QUESTIONNAIRE - PHQ9
SUM OF ALL RESPONSES TO PHQ QUESTIONS 1-9: 0
SUM OF ALL RESPONSES TO PHQ QUESTIONS 1-9: 0
2. FEELING DOWN, DEPRESSED OR HOPELESS: 0
1. LITTLE INTEREST OR PLEASURE IN DOING THINGS: 0
SUM OF ALL RESPONSES TO PHQ QUESTIONS 1-9: 0
SUM OF ALL RESPONSES TO PHQ9 QUESTIONS 1 & 2: 0
SUM OF ALL RESPONSES TO PHQ QUESTIONS 1-9: 0

## 2022-08-26 ASSESSMENT — ENCOUNTER SYMPTOMS
VOMITING: 0
SHORTNESS OF BREATH: 0
EYE PAIN: 0
COUGH: 0
ABDOMINAL PAIN: 0
BACK PAIN: 0
WHEEZING: 0
NAUSEA: 0
CONSTIPATION: 0
SINUS PAIN: 0
DIARRHEA: 0

## 2022-08-26 NOTE — ASSESSMENT & PLAN NOTE
Patient with recent Thyroid labs from ED visit at [facility] concerning for hyperthryoidsm. Palpable goiter on PE. Pectus Carinatum present. Father was an Extractor at the [employer] prior to her conception.

## 2022-08-26 NOTE — ACP (ADVANCE CARE PLANNING)
Advance Care Planning   Advance Care Planning (ACP)     Attempted to discuss ACP with Ms. Gallo Autumn today, she declines to discuss at this time. Will readdress at future visit.

## 2022-08-26 NOTE — PROGRESS NOTES
Steph Terry (: 1990) presents today for:  Chief Complaint   Patient presents with    Established New Doctor     Pt is here to estab PCP care. Fatigue     Pt states sx's have been present over the last 2 weeks. Follow-up     Pt was seen in the ER  in [facility] with [facility]. Pt had EKG, and imaging of the heart. Also labs for thyroid. Referral - General     Pt was also advised by ER to have referral to [facility] with [facility]. Skin Exam     Pt has a mole on the L side of body under breast that has been present over the last year and has changed in size. Patient is a 57-year-old [place]er-speaking woman with no immediate significant medical history aside from gestational diabetes that presents to establish care with a new PCP. She does have complaints currently of fatigue. Patient recently to [place] ED for concerns of hyperthyroidism and fatigue. Labs drawn most recently 8/15/2022. Patient will require referral to endocrinology within the [facility] for insurance related needs. Referring at this time with recommendation for hyperthyroid evaluation and management. Patient previously in sinus tachycardia at [place] ED, currently tachycardic at 108. BP elevated to 145/97. Patient states BP normal at home. Will have BP checks at home. Referring to Endo. Labs of 11 days ago adequate currently. Will offer low dose beta blockade for tachycardia and slight elevation in BP. Patient to return to office     Patient has been checking her HR using her Apple Watch numerous times daily. She experiences sudden drops in rate to 45 and then sudden increased to 126. This was the initial reason for presentation to [place] ED. Will offer Tapazole and Propranol at this time. Notably, her father was an \"extractor\" at [employer] prior to her conception. Fatigue  Associated symptoms include fatigue.  Pertinent negatives include no abdominal pain, arthralgias, chest pain, coughing, fever, headaches, joint swelling, nausea, rash, vomiting or weakness. Assessment/Plan:  1. Establishing care with new doctor, encounter for    2. Hyperthyroidism    3. Hx gestational diabetes    4. Elevated blood pressure reading in office without diagnosis of hypertension    5. Obesity (BMI 30-39.9)    6. Tachycardia    7. Changing nevus    8. Pectus carinatum        Orders Placed This Encounter   Procedures    US THYROID     Standing Status:   Future     Standing Expiration Date:   8/26/2023    Lipid Panel     Standing Status:   Future     Standing Expiration Date:   8/26/2023    Hemoglobin A1C     Standing Status:   Future     Standing Expiration Date:   8/26/2023    Marleni Langford MD, Endocrinology, [place]     Referral Priority:   Routine     Referral Type:   Eval and Treat     Referral Reason:   Specialty Services Required     Referred to Provider:   Radha Dolan MD     Requested Specialty:   Endocrinology     Number of Visits Requested:   1    NIDIA Sullivan MD, [place]     Referral Priority:   Routine     Referral Type:   Eval and Treat     Referral Reason:   Specialty Services Required     Referred to Provider:   Tiffani Woodson MD     Requested Specialty:   Dermatology     Number of Visits Requested:   1         Orders Placed This Encounter   Medications    methIMAzole (TAPAZOLE) 5 MG tablet     Sig: Take 1 tablet by mouth 3 times daily     Dispense:  90 tablet     Refill:  4    propranolol (INDERAL LA) 60 MG extended release capsule     Sig: Take 1 capsule by mouth daily     Dispense:  90 capsule     Refill:  0           Return in about 3 months (around 11/26/2022), or for hyperthyroidism, for blood pressure check, weight check. Reviewed and updated this visit by provider:  Tobacco  Allergies  Meds  Problems  Med Hx  Surg Hx  Fam Hx         Review of Systems   Constitutional:  Positive for fatigue. Negative for activity change and fever.    HENT:  Negative for nosebleeds, postnasal drip, sinus pain and sneezing. Eyes:  Negative for pain and visual disturbance. Respiratory:  Negative for cough, shortness of breath and wheezing. Cardiovascular:  Negative for chest pain and palpitations. Gastrointestinal:  Negative for abdominal pain, constipation, diarrhea, nausea and vomiting. Endocrine: Negative for polydipsia and polyuria. Genitourinary:  Negative for difficulty urinating and frequency. Musculoskeletal:  Negative for arthralgias, back pain and joint swelling. Skin:  Negative for rash and wound. Neurological:  Negative for weakness, light-headedness and headaches. Psychiatric/Behavioral:  Negative for agitation and behavioral problems. The patient is not nervous/anxious. Visit Vitals  BP (!) 145/97   Pulse (!) 108   Temp 98.1 °F (36.7 °C)   Ht 5' 9\" (1.753 m)   Wt 233 lb 9.6 oz (106 kg)   SpO2 96%   BMI 34.50 kg/m²       Physical Exam  Vitals reviewed. Constitutional:       Appearance: Normal appearance. HENT:      Head: Normocephalic and atraumatic. Right Ear: External ear normal.      Left Ear: External ear normal.      Nose: Nose normal.      Mouth/Throat:      Mouth: Mucous membranes are moist.      Pharynx: Oropharynx is clear. Eyes:      Extraocular Movements: Extraocular movements intact. Conjunctiva/sclera: Conjunctivae normal.   Neck:      Comments: Palpable goiter  Cardiovascular:      Rate and Rhythm: Tachycardia present. Pulses: Normal pulses. Heart sounds: Normal heart sounds. Pulmonary:      Effort: Pulmonary effort is normal.      Breath sounds: Normal breath sounds. Abdominal:      General: Abdomen is flat. There is no distension. Palpations: Abdomen is soft. Musculoskeletal:         General: No swelling or deformity. Normal range of motion. Cervical back: Normal range of motion. Skin:     General: Skin is warm and dry. Neurological:      General: No focal deficit present.       Mental Status: She is alert and oriented to person, place, and time. Mental status is at baseline. Psychiatric:         Mood and Affect: Mood normal.         Behavior: Behavior normal.         Thought Content:  Thought content normal.         Judgment: Judgment normal.           Jaida Estes, DO

## 2022-08-27 LAB
CHOLEST SERPL-MCNC: 177 MG/DL
EST. AVERAGE GLUCOSE BLD GHB EST-MCNC: 126 MG/DL
HBA1C MFR BLD: 6 % (ref 4.8–5.6)
HDLC SERPL-MCNC: 33 MG/DL (ref 40–60)
HDLC SERPL: 5.4 {RATIO}
LDLC SERPL CALC-MCNC: 101.8 MG/DL
TRIGL SERPL-MCNC: 211 MG/DL (ref 35–150)
VLDLC SERPL CALC-MCNC: 42.2 MG/DL (ref 6–23)

## 2022-09-14 DIAGNOSIS — E05.90 HYPERTHYROIDISM: ICD-10-CM

## 2022-09-15 DIAGNOSIS — E05.90 HYPERTHYROIDISM: Primary | ICD-10-CM

## 2022-09-16 RX ORDER — METHIMAZOLE 5 MG/1
5 TABLET ORAL 3 TIMES DAILY
Qty: 90 TABLET | Refills: 1 | Status: SHIPPED | OUTPATIENT
Start: 2022-09-16 | End: 2022-10-27 | Stop reason: ALTCHOICE

## 2022-10-25 ENCOUNTER — HOSPITAL ENCOUNTER (OUTPATIENT)
Dept: ULTRASOUND IMAGING | Age: 32
Discharge: HOME | End: 2022-10-28
Payer: COMMERCIAL

## 2022-10-25 DIAGNOSIS — E05.90 HYPERTHYROIDISM: ICD-10-CM

## 2022-10-25 PROCEDURE — 76536 US EXAM OF HEAD AND NECK: CPT

## 2022-10-27 ENCOUNTER — OFFICE VISIT (OUTPATIENT)
Dept: ENDOCRINOLOGY | Age: 32
End: 2022-10-27
Payer: COMMERCIAL

## 2022-10-27 VITALS
DIASTOLIC BLOOD PRESSURE: 92 MMHG | OXYGEN SATURATION: 96 % | SYSTOLIC BLOOD PRESSURE: 122 MMHG | HEART RATE: 91 BPM | WEIGHT: 237 LBS | BODY MASS INDEX: 35 KG/M2

## 2022-10-27 DIAGNOSIS — E05.90 HYPERTHYROIDISM: Primary | ICD-10-CM

## 2022-10-27 DIAGNOSIS — R00.0 TACHYCARDIA: ICD-10-CM

## 2022-10-27 DIAGNOSIS — E06.9 THYROIDITIS: Primary | ICD-10-CM

## 2022-10-27 PROCEDURE — 99204 OFFICE O/P NEW MOD 45 MIN: CPT | Performed by: INTERNAL MEDICINE

## 2022-10-27 ASSESSMENT — ENCOUNTER SYMPTOMS
WHEEZING: 0
CONSTIPATION: 0
VOICE CHANGE: 0
EYE PAIN: 0
COUGH: 0
ABDOMINAL PAIN: 0
SHORTNESS OF BREATH: 1
TROUBLE SWALLOWING: 0
VOMITING: 0
DIARRHEA: 0
NAUSEA: 0

## 2022-10-27 NOTE — PROGRESS NOTES
Timothy Carrera MD, [facility]  [address], [address]  [place], [place]  Phone [phone]  Facsimile [phone]          Jennifer Vargas is a [address] y.o. female seen 10/27/2022 at the request of Dr. Manny Reardon for the evaluation of hyperthyroidism        ASSESSMENT AND PLAN:    1. Thyroiditis  She was initially noted to be thyrotoxic in 8/2022. She is now biochemically hypothyroid. We discussed that her clinical course is consistent with thyroiditis. We discussed that thyroiditis typically has a thyrotoxic phase, followed by a hypothyroid phase with eventual recovery (versus permanent hypothyroidism). I will repeat her thyroid function tests in 4 weeks and prior to next visit. We discussed that methimazole has no role in the treatment of thyroiditis. 2. Tachycardia  Resolved. I will have her discontinue her propranolol. Follow-up and Dispositions    Return 8 weeks (Friday afternoon is okay). HISTORY OF PRESENT ILLNESS:    THYROID DISEASE    Presentation/Diagnosis: Abnormal thyroid function tests noted in the emergency department during the evaluation of irregular heart rate. Symptoms: Weight increased 4 pounds the past 2 months. She reports that she has gained over 100 pounds the past 5 years. She reports fatigue for the past 5 years. She reports some depression. She was having palpitations in 8/2022 when she went to the ED, but they have resolved. She reports occasional tremors. She reports heat intolerance and some excessive sweating. Denies diarrhea, constipation, difficulty sleeping. Denies anterior neck swelling, pain or tenderness. She reports occasional cervical pressure. No recent upper respiratory infection. Denies the use of iodine or biotin-containing supplements. Denies proptosis, excessive tearing, eye pain. Treatment: Methimazole started 8/2022 but she only took it for a couple weeks.     Imaging:  Thyroid ultrasound  10/25/2022 ([facility]): Right lobe 4.8 x 1.9 x 1.4 cm, homogeneous echotexture, no nodules. Left lobe 5.1 x 1.5 x 1 cm, homogeneous echotexture, no nodules. Isthmus measures 0.4 cm    Labs:  8/15/2022: TSH 0.071, free T4 1.45, beta-hCG <2.  10/25/2022: TSH 39.100, free T4 0.47. Pregnancy status: No pregnancy plans. Past Medical History:   Diagnosis Date    History of gestational diabetes     Obesity        Past Surgical History:   Procedure Laterality Date     SECTION         Allergies:  Nsaids    Medications:  Reviewed in chart. Social History     Socioeconomic History    Marital status:      Spouse name: Not on file    Number of children: Not on file    Years of education: Not on file    Highest education level: Not on file   Occupational History    Not on file   Tobacco Use    Smoking status: Never    Smokeless tobacco: Never   Vaping Use    Vaping Use: Never used   Substance and Sexual Activity    Alcohol use: Never    Drug use: Never    Sexual activity: Yes     Partners: Male   Other Topics Concern    Not on file   Social History Narrative    Not on file     Social Determinants of Health     Financial Resource Strain: Not on file   Food Insecurity: Not on file   Transportation Needs: Not on file   Physical Activity: Not on file   Stress: Not on file   Social Connections: Not on file   Intimate Partner Violence: Not on file   Housing Stability: Not on file       Family History   Problem Relation Age of Onset    Thyroid Disease Neg Hx        Review of Systems   Constitutional:  Positive for diaphoresis, fatigue and unexpected weight change. HENT:  Negative for trouble swallowing and voice change. Eyes:  Negative for pain and visual disturbance. Respiratory:  Positive for shortness of breath. Negative for cough and wheezing. Cardiovascular:  Negative for chest pain, palpitations and leg swelling.    Gastrointestinal:  Negative for abdominal pain, constipation, diarrhea, nausea and vomiting. Endocrine: Positive for heat intolerance. Negative for cold intolerance, polydipsia, polyphagia and polyuria. Genitourinary:  Negative for difficulty urinating, flank pain and menstrual problem. Musculoskeletal:  Negative for arthralgias and myalgias. Neurological:  Positive for headaches. Negative for dizziness, tremors, weakness, light-headedness and numbness. Hematological:  Negative for adenopathy. Psychiatric/Behavioral:  Positive for dysphoric mood. Negative for sleep disturbance. The patient is not nervous/anxious. Vital Signs:  BP (!) 122/92 (Site: Left Upper Arm, Position: Sitting)   Pulse 91   Wt 237 lb (107.5 kg)   SpO2 96%   BMI 35.00 kg/m²     Wt Readings from Last 3 Encounters:   10/27/22 237 lb (107.5 kg)   08/26/22 233 lb 9.6 oz (106 kg)       Physical Exam  Constitutional:       General: She is not in acute distress. HENT:      Head: Normocephalic and atraumatic. Eyes:      Comments: No proptosis. Neck:      Thyroid: No thyroid mass or thyromegaly. Cardiovascular:      Rate and Rhythm: Normal rate and regular rhythm. Heart sounds: No murmur heard. Pulmonary:      Effort: Pulmonary effort is normal.      Breath sounds: Normal breath sounds. Abdominal:      General: Bowel sounds are normal.      Palpations: Abdomen is soft. There is no mass. Tenderness: There is no abdominal tenderness. Musculoskeletal:         General: Normal range of motion. Lymphadenopathy:      Cervical: No cervical adenopathy. Skin:     General: Skin is warm and dry. Neurological:      Motor: No tremor. Deep Tendon Reflexes: Reflexes are normal and symmetric.    Psychiatric:         Mood and Affect: Mood normal.         Behavior: Behavior normal.           Orders Placed This Encounter   Procedures    TSH     Standing Status:   Future     Standing Expiration Date:   10/27/2023    T4, Free     Standing Status:   Future     Standing Expiration Date: 10/27/2023    TSH     Standing Status:   Future     Standing Expiration Date:   10/27/2023    T4, Free     Standing Status:   Future     Standing Expiration Date:   10/27/2023         No current outpatient medications on file. No current facility-administered medications for this visit.

## 2022-11-26 LAB
T3 SERPL-MCNC: 141 NG/DL (ref 71–180)
T4 FREE SERPL-MCNC: 0.86 NG/DL (ref 0.82–1.77)
TSH SERPL DL<=0.005 MIU/L-ACNC: 11.7 UIU/ML (ref 0.45–4.5)

## 2022-11-28 ENCOUNTER — OFFICE VISIT (OUTPATIENT)
Dept: INTERNAL MEDICINE CLINIC | Facility: CLINIC | Age: 32
End: 2022-11-28
Payer: COMMERCIAL

## 2022-11-28 VITALS
HEIGHT: 69 IN | HEART RATE: 84 BPM | DIASTOLIC BLOOD PRESSURE: 90 MMHG | SYSTOLIC BLOOD PRESSURE: 131 MMHG | TEMPERATURE: 98.4 F | BODY MASS INDEX: 35.49 KG/M2 | WEIGHT: 239.6 LBS | OXYGEN SATURATION: 96 %

## 2022-11-28 DIAGNOSIS — Z12.4 CERVICAL CANCER SCREENING: ICD-10-CM

## 2022-11-28 DIAGNOSIS — E55.9 VITAMIN D DEFICIENCY: ICD-10-CM

## 2022-11-28 DIAGNOSIS — E06.9 THYROIDITIS: ICD-10-CM

## 2022-11-28 DIAGNOSIS — R73.03 PREDIABETES: Primary | ICD-10-CM

## 2022-11-28 PROCEDURE — 99214 OFFICE O/P EST MOD 30 MIN: CPT | Performed by: INTERNAL MEDICINE

## 2022-11-28 ASSESSMENT — ENCOUNTER SYMPTOMS
SINUS PAIN: 0
EYE PAIN: 0
CONSTIPATION: 0
NAUSEA: 0
BACK PAIN: 0
WHEEZING: 0
SHORTNESS OF BREATH: 0
ABDOMINAL PAIN: 0
COUGH: 0
DIARRHEA: 0
VOMITING: 0

## 2022-11-28 ASSESSMENT — PATIENT HEALTH QUESTIONNAIRE - PHQ9
SUM OF ALL RESPONSES TO PHQ QUESTIONS 1-9: 0
2. FEELING DOWN, DEPRESSED OR HOPELESS: 0
SUM OF ALL RESPONSES TO PHQ9 QUESTIONS 1 & 2: 0
1. LITTLE INTEREST OR PLEASURE IN DOING THINGS: 0
SUM OF ALL RESPONSES TO PHQ QUESTIONS 1-9: 0

## 2022-11-28 NOTE — PROGRESS NOTES
Gavino Corbett (: 1990) presents today for:  Chief Complaint   Patient presents with    Follow-up     Pt is here to f/u. Pt has previous labs for TSH with . pt has no complaints today and is fasting. Patient is a 58-year-old woman with medical history significant for thyroiditis, gestational diabetes, elevated blood pressure without the diagnosis of hypertension that presents for follow-up visit. Patient last in office on 2022 with concerns of palpitations, and bradycardia as measured with her apple watch. Patient with a family history concerning for radiation exposure. Patient has established with endocrinology. Patient previously prescribed Tapazole which has been discontinued by endocrinology. Patient will follow office for thyroiditis. Patient with repeat thyroid labs ordered by endocrinology with redemonstrated elevation in TSH and normal T4 and T3. Patient's T4 notably in the extreme lower end of normal.    Patient with elevated triglyceride levels last office visit to 211. Patient's A1c in the prediabetic range at 6.0. Patient is eligible for several care gaps including flu vaccine, Tdap, hepatitis C screen, cervical cancer screening. Will refer to gynecology for Pap. We will offer Tdap as well as flu at this time. Encouraging avoidance of added sugars and increasing fiber in diet. Recommended 150 minutes of moderate to vigorous physical activity on a weekly basis. Patient advised she is currently in the obese category in a prediabetic state. Encourag dietary restrictions as well as increased physical activity. Translation services provided by [employer] #[id number]    Patient states she likes her Endocrinology team. She will be back to  that office in 30 days. She anticipates repeat labs to be drawn prior to that visit. She only rarely experiences elevated heart rate. She is most often finding HR in the 80's.      Patient with some concerns of elevated A1c and lipid panel. She is working on limiting carbohydrates in diet. Avoid added sugars and add fiber to dietary choices. Patients vitamin D subtherapeutic level while on 7000 IU daily. When lab drawn, patient taking inconsistently. Recommending daily use. We can recheck in roughly 6 months after daily use. Patient to return in 6 months time. Assessment/Plan:  1. Prediabetes    2. Thyroiditis    3. Cervical cancer screening    4. Vitamin D deficiency        No orders of the defined types were placed in this encounter. No orders of the defined types were placed in this encounter. Return in about 6 months (around 5/28/2023). Reviewed and updated this visit by provider:  Allergies         Review of Systems   Constitutional:  Negative for activity change and fever. HENT:  Negative for nosebleeds, postnasal drip, sinus pain and sneezing. Eyes:  Negative for pain and visual disturbance. Respiratory:  Negative for cough, shortness of breath and wheezing. Cardiovascular:  Negative for chest pain and palpitations. Gastrointestinal:  Negative for abdominal pain, constipation, diarrhea, nausea and vomiting. Endocrine: Negative for polydipsia and polyuria. Genitourinary:  Negative for difficulty urinating and frequency. Musculoskeletal:  Negative for arthralgias, back pain and joint swelling. Skin:  Negative for rash and wound. Neurological:  Negative for weakness, light-headedness and headaches. Psychiatric/Behavioral:  Negative for agitation and behavioral problems. The patient is not nervous/anxious. Visit Vitals  BP (!) 131/90   Pulse 84   Temp 98.4 °F (36.9 °C)   Ht 5' 9\" (1.753 m)   Wt 239 lb 9.6 oz (108.7 kg)   SpO2 96%   BMI 35.38 kg/m²       Physical Exam  Vitals reviewed. Constitutional:       Appearance: Normal appearance. HENT:      Head: Normocephalic and atraumatic.       Right Ear: External ear normal.      Left Ear: External ear normal.      Nose: Nose normal.      Mouth/Throat:      Mouth: Mucous membranes are moist.      Pharynx: Oropharynx is clear. Eyes:      Extraocular Movements: Extraocular movements intact. Conjunctiva/sclera: Conjunctivae normal.   Cardiovascular:      Rate and Rhythm: Normal rate. Pulses: Normal pulses. Heart sounds: Normal heart sounds. Pulmonary:      Effort: Pulmonary effort is normal.      Breath sounds: Normal breath sounds. Abdominal:      General: Abdomen is flat. There is no distension. Palpations: Abdomen is soft. Musculoskeletal:         General: No swelling or deformity. Normal range of motion. Skin:     General: Skin is warm and dry. Neurological:      General: No focal deficit present. Mental Status: She is alert and oriented to person, place, and time. Mental status is at baseline. Psychiatric:         Mood and Affect: Mood normal.         Behavior: Behavior normal.         Thought Content:  Thought content normal.         Judgment: Judgment normal.           Fred Jarrell,

## 2022-11-28 NOTE — ACP (ADVANCE CARE PLANNING)
Advance Care Planning   The patient has the following advanced directives on file:  Advance Directives       Power of [address] ACP-Advance Directive ACP-Power of     Not on File Not on File Not on File Not on File            The patient has appointed the following active healthcare agents:    Primary Decision Maker: Jarrod Colon  [phone]    The Patient has the following current code status:    Code Status: Not on file    Visit Documentation:  I discussed [address] with Jennifer Sam today which included the importance of making their choices for care and treatment in the case of a health event that adversely affects their decision-making abilities. She has not completed the Advance Care Directives. She does not have an active health care agent at this time. Jennifer Vargas was encouraged to complete the declaration forms and provide a signed copy of her medical records.      Matt Acevedo  11/28/2022

## 2022-12-22 LAB
T4 SERPL-MCNC: 5.8 UG/DL (ref 4.5–12)
TSH SERPL DL<=0.005 MIU/L-ACNC: 9.59 UIU/ML (ref 0.45–4.5)

## 2022-12-23 ENCOUNTER — OFFICE VISIT (OUTPATIENT)
Dept: ENDOCRINOLOGY | Age: 32
End: 2022-12-23
Payer: COMMERCIAL

## 2022-12-23 VITALS
HEIGHT: 69 IN | OXYGEN SATURATION: 97 % | SYSTOLIC BLOOD PRESSURE: 128 MMHG | HEART RATE: 95 BPM | DIASTOLIC BLOOD PRESSURE: 78 MMHG | WEIGHT: 240 LBS | BODY MASS INDEX: 35.55 KG/M2

## 2022-12-23 DIAGNOSIS — E03.8 SUBCLINICAL HYPOTHYROIDISM: Primary | ICD-10-CM

## 2022-12-23 DIAGNOSIS — R00.1 BRADYCARDIA: ICD-10-CM

## 2022-12-23 PROCEDURE — 99214 OFFICE O/P EST MOD 30 MIN: CPT | Performed by: INTERNAL MEDICINE

## 2022-12-23 ASSESSMENT — ENCOUNTER SYMPTOMS
DIARRHEA: 0
CONSTIPATION: 0

## 2022-12-23 NOTE — PROGRESS NOTES
Timothy Denis MD, [facility]  [address], [address]  [place], [address] [place]  Phone [phone]          Tanna Jordan is a 27 y.o. female seen 12/23/2022 for follow up of thyroiditis        ASSESSMENT AND PLAN:    1. Subclinical hypothyroidism  Her clinical course was consistent with thyroiditis. She is currently in the hypothyroid phase, but her thyroid function appears to be slowly improving. I will repeat her thyroid function tests in 4 weeks and prior to next visit. I will consider thyroid hormone replacement if her thyroid function tests do not normalize in the near future. 2. Bradycardia  She reports some episodic bradycardia. I had her discontinue her propranolol at her last visit. This may ultimately end up being her indication for treatment of her subclinical hypothyroidism. Follow-up and Dispositions    Return 8 weeks (Friday afternoon is okay). HISTORY OF PRESENT ILLNESS:    THYROID DISEASE    Presentation/Diagnosis: Abnormal thyroid function tests noted in the emergency department during the evaluation of irregular heart rate. Symptoms: See review of systems. Treatment: Methimazole started 8/2022 but she only took it for a couple weeks. Imaging:  Thyroid ultrasound  10/25/2022 ([facility]): Right lobe 4.8 x 1.9 x 1.4 cm, homogeneous echotexture, no nodules. Left lobe 5.1 x 1.5 x 1 cm, homogeneous echotexture, no nodules. Isthmus measures 0.4 cm    Labs:  8/15/2022: TSH 0.071, free T4 1.45, beta-hCG <2.  10/25/2022: TSH 39.100, free T4 0.47.  11/25/2022: TSH 11.700, free T4 0.86, total T3 141.  12/21/2022: TSH 9.590, total T4 5.8. Pregnancy status: No pregnancy plans. Review of Systems   Constitutional:  Positive for diaphoresis and fatigue. Weight increased 5 pounds since last visit. HENT:          She reports occasional anterior neck pain.    Cardiovascular:         She has some episodes of bradycardia in the 40s associated with a forceful heartbeat in her neck. Her pulse then goes up to the high 90s. Gastrointestinal:  Negative for constipation and diarrhea. Endocrine: Positive for heat intolerance. Negative for cold intolerance. Genitourinary:  Positive for menstrual problem (cycles every 60 days). Neurological:  Positive for tremors. Psychiatric/Behavioral:  Negative for sleep disturbance. Vital Signs:  /78   Pulse 95   Ht 5' 9\" (1.753 m)   Wt 240 lb (108.9 kg)   SpO2 97%   BMI 35.44 kg/m²     Wt Readings from Last 3 Encounters:   12/23/22 240 lb (108.9 kg)   11/28/22 239 lb 9.6 oz (108.7 kg)   10/27/22 237 lb (107.5 kg)       Physical Exam  Constitutional:       General: She is not in acute distress. Neck:      Thyroid: No thyroid mass or thyromegaly. Cardiovascular:      Rate and Rhythm: Normal rate and regular rhythm. Lymphadenopathy:      Cervical: No cervical adenopathy. Neurological:      Motor: Tremor present. Orders Placed This Encounter   Procedures    TSH     Standing Status:   Future     Standing Expiration Date:   12/23/2023    T4, Free     Standing Status:   Future     Standing Expiration Date:   12/23/2023    TSH     Standing Status:   Future     Standing Expiration Date:   12/23/2023    T4, Free     Standing Status:   Future     Standing Expiration Date:   12/23/2023           No current outpatient medications on file. No current facility-administered medications for this visit.

## 2022-12-28 PROBLEM — Z12.4 CERVICAL CANCER SCREENING: Status: RESOLVED | Noted: 2022-11-28 | Resolved: 2022-12-28

## 2022-12-29 ENCOUNTER — PATIENT MESSAGE (OUTPATIENT)
Dept: INTERNAL MEDICINE CLINIC | Facility: CLINIC | Age: 32
End: 2022-12-29

## 2022-12-29 DIAGNOSIS — E06.9 THYROIDITIS: Primary | ICD-10-CM

## 2022-12-29 DIAGNOSIS — O92.70 LACTATION DISORDER: ICD-10-CM

## 2022-12-30 NOTE — TELEPHONE ENCOUNTER
From: Monique Sorensen  To: Dr. Everett Kauffman: 12/29/2022 7:07 PM EST  Subject: feel a rush of milk to my chest    Hello! I would like to ask the doctor for advice. I feel a rush of milk to my chest. I am not pregnant and have not  a child for many years. Maybe the doctor can prescribe a prolactin test? Or give me a referral to another doctor? the gynecologist did not contact me (I need to do a smear) or should I contact an endocrinologist? Thank you so much !

## 2023-03-09 LAB
T4 FREE SERPL-MCNC: 1.1 NG/DL (ref 0.82–1.77)
TSH SERPL DL<=0.005 MIU/L-ACNC: 3.38 UIU/ML (ref 0.45–4.5)

## 2023-05-26 ENCOUNTER — TELEPHONE (OUTPATIENT)
Dept: INTERNAL MEDICINE CLINIC | Facility: CLINIC | Age: 33
End: 2023-05-26

## (undated) DEVICE — CANISTER SUCTION 3000ML MECHANICAL FILTER AUTO SHUTOFF MEDI-VAC NONSTERILE LF DISP  (40EA/CA)

## (undated) DEVICE — DRESSING POST OP BORDER 4 X 10 (5EA/BX)

## (undated) DEVICE — PACK ROOM TURNOVER L&D (12/CA)

## (undated) DEVICE — GLOVE BIOGEL SZ 6 PF LATEX - (50EA/BX 4BX/CA)

## (undated) DEVICE — WATER IRRIG. STER. 1500 ML - (9/CA)

## (undated) DEVICE — TUBING CLEARLINK DUO-VENT - C-FLO (48EA/CA)

## (undated) DEVICE — HEAD HOLDER JUNIOR/ADULT

## (undated) DEVICE — GLOVE BIOGEL INDICATOR SZ 6.5 SURGICAL PF LTX - (50PR/BX 4BX/CA)

## (undated) DEVICE — TRAY BLADDER CARE W/ 16 FR FOLEY CATHETER STATLOCK  (10/CA)

## (undated) DEVICE — DETERGENT RENUZYME PLUS 10 OZ PACKET (50/BX)

## (undated) DEVICE — TRAY SPINAL ANESTHESIA NON-SAFETY (10/CA)

## (undated) DEVICE — SUTURE 4-0 27IN VCRL PLUS ANTI (36PK/BX)

## (undated) DEVICE — SUTURE 0 VICRYL PLUS CT-1 - 36 INCH (36/BX)

## (undated) DEVICE — CHLORAPREP 26 ML APPLICATOR - ORANGE TINT(25/CA)

## (undated) DEVICE — SLEEVE, SEQUENTIAL CALF REG

## (undated) DEVICE — PACK C-SECTION (2EA/CA)

## (undated) DEVICE — KIT  I.V. START (100EA/CA)

## (undated) DEVICE — CATHETER IV NON-SAFETY 18 GA X 1 1/4 (50/BX 4BX/CA)

## (undated) DEVICE — SODIUM CHL IRRIGATION 0.9% 1000ML (12EA/CA)

## (undated) DEVICE — SHEATH RO 4F 10CM (10EA/BX)

## (undated) DEVICE — SET EXTENSION WITH 2 PORTS (48EA/CA) ***PART #2C8610 IS A SUBSTITUTE*****

## (undated) DEVICE — SOLUTION PLASMA-LYTE PH 7.4 INJ 1000ML  (14EA/CA)